# Patient Record
Sex: MALE | Race: WHITE | NOT HISPANIC OR LATINO | Employment: FULL TIME | URBAN - METROPOLITAN AREA
[De-identification: names, ages, dates, MRNs, and addresses within clinical notes are randomized per-mention and may not be internally consistent; named-entity substitution may affect disease eponyms.]

---

## 2017-02-20 ENCOUNTER — COMMUNICATION - HEALTHEAST (OUTPATIENT)
Dept: FAMILY MEDICINE | Facility: CLINIC | Age: 52
End: 2017-02-20

## 2017-02-20 DIAGNOSIS — F17.200 TOBACCO USE DISORDER: ICD-10-CM

## 2018-06-21 ENCOUNTER — OFFICE VISIT - HEALTHEAST (OUTPATIENT)
Dept: FAMILY MEDICINE | Facility: CLINIC | Age: 53
End: 2018-06-21

## 2018-06-21 ENCOUNTER — COMMUNICATION - HEALTHEAST (OUTPATIENT)
Dept: TELEHEALTH | Facility: CLINIC | Age: 53
End: 2018-06-21

## 2018-06-21 DIAGNOSIS — L72.3 SEBACEOUS CYST: ICD-10-CM

## 2018-06-21 DIAGNOSIS — Z12.11 SCREEN FOR COLON CANCER: ICD-10-CM

## 2018-06-21 DIAGNOSIS — Z23 NEED FOR VACCINATION: ICD-10-CM

## 2018-06-21 DIAGNOSIS — E66.9 OBESITY: ICD-10-CM

## 2018-06-21 DIAGNOSIS — F17.200 TOBACCO USE DISORDER: ICD-10-CM

## 2018-06-21 DIAGNOSIS — Z00.00 ANNUAL PHYSICAL EXAM: ICD-10-CM

## 2018-06-21 DIAGNOSIS — E78.5 HYPERLIPIDEMIA, UNSPECIFIED HYPERLIPIDEMIA TYPE: ICD-10-CM

## 2018-06-21 DIAGNOSIS — E55.9 VITAMIN D DEFICIENCY: ICD-10-CM

## 2018-06-21 LAB
ALBUMIN SERPL-MCNC: 4.3 G/DL (ref 3.5–5)
ALP SERPL-CCNC: 96 U/L (ref 45–120)
ALT SERPL W P-5'-P-CCNC: 17 U/L (ref 0–45)
ANION GAP SERPL CALCULATED.3IONS-SCNC: 10 MMOL/L (ref 5–18)
AST SERPL W P-5'-P-CCNC: 16 U/L (ref 0–40)
BILIRUB SERPL-MCNC: 0.6 MG/DL (ref 0–1)
BUN SERPL-MCNC: 18 MG/DL (ref 8–22)
CALCIUM SERPL-MCNC: 10.2 MG/DL (ref 8.5–10.5)
CHLORIDE BLD-SCNC: 104 MMOL/L (ref 98–107)
CHOLEST SERPL-MCNC: 201 MG/DL
CO2 SERPL-SCNC: 28 MMOL/L (ref 22–31)
CREAT SERPL-MCNC: 0.91 MG/DL (ref 0.7–1.3)
FASTING STATUS PATIENT QL REPORTED: YES
GFR SERPL CREATININE-BSD FRML MDRD: >60 ML/MIN/1.73M2
GLUCOSE BLD-MCNC: 88 MG/DL (ref 70–125)
HDLC SERPL-MCNC: 36 MG/DL
HGB BLD-MCNC: 15.4 G/DL (ref 14–18)
LDLC SERPL CALC-MCNC: 122 MG/DL
POTASSIUM BLD-SCNC: 5.2 MMOL/L (ref 3.5–5)
PROT SERPL-MCNC: 7.5 G/DL (ref 6–8)
PSA SERPL-MCNC: 1 NG/ML (ref 0–3.5)
SODIUM SERPL-SCNC: 142 MMOL/L (ref 136–145)
TRIGL SERPL-MCNC: 217 MG/DL

## 2018-06-21 ASSESSMENT — MIFFLIN-ST. JEOR: SCORE: 1944.42

## 2018-06-22 ENCOUNTER — AMBULATORY - HEALTHEAST (OUTPATIENT)
Dept: FAMILY MEDICINE | Facility: CLINIC | Age: 53
End: 2018-06-22

## 2018-06-22 ENCOUNTER — COMMUNICATION - HEALTHEAST (OUTPATIENT)
Dept: FAMILY MEDICINE | Facility: CLINIC | Age: 53
End: 2018-06-22

## 2018-06-22 DIAGNOSIS — E78.1 PURE HYPERGLYCERIDEMIA: ICD-10-CM

## 2018-06-22 DIAGNOSIS — E78.5 HYPERLIPEMIA: ICD-10-CM

## 2018-06-22 LAB — 25(OH)D3 SERPL-MCNC: 33.3 NG/ML (ref 30–80)

## 2018-07-09 ENCOUNTER — RECORDS - HEALTHEAST (OUTPATIENT)
Dept: ADMINISTRATIVE | Facility: OTHER | Age: 53
End: 2018-07-09

## 2018-07-16 ENCOUNTER — RECORDS - HEALTHEAST (OUTPATIENT)
Dept: ADMINISTRATIVE | Facility: OTHER | Age: 53
End: 2018-07-16

## 2018-07-16 ENCOUNTER — RECORDS - HEALTHEAST (OUTPATIENT)
Dept: LAB | Facility: CLINIC | Age: 53
End: 2018-07-16

## 2018-07-16 LAB
APPEARANCE FLD: ABNORMAL
COLOR FLD: YELLOW
CRYSTALS SNV MICRO: ABNORMAL
GLUCOSE FLD-MCNC: 92 MG/DL
LYMPHOCYTES NFR FLD MANUAL: 7 %
MACROPHAGE % - HISTORICAL: 4 %
MONOCYTE % - HISTORICAL: 4 %
NEUTS BAND NFR FLD MANUAL: 82 %
OTHER CELLS FLD MANUAL: 4 %
PROT FLD-MCNC: 4.4 G/DL
RBC FLUID - HISTORICAL: ABNORMAL /UL
WBC # FLD AUTO: ABNORMAL /UL (ref 0–99)

## 2018-07-19 LAB
BACTERIA SPEC CULT: NO GROWTH
BACTERIA SPEC CULT: NORMAL
GRAM STAIN RESULT: NORMAL
GRAM STAIN RESULT: NORMAL

## 2021-01-08 ENCOUNTER — COMMUNICATION - HEALTHEAST (OUTPATIENT)
Dept: TELEHEALTH | Facility: CLINIC | Age: 56
End: 2021-01-08

## 2021-01-08 ENCOUNTER — OFFICE VISIT - HEALTHEAST (OUTPATIENT)
Dept: FAMILY MEDICINE | Facility: CLINIC | Age: 56
End: 2021-01-08

## 2021-01-08 DIAGNOSIS — Z71.89 COUNSELING ON HEALTH CARE DIRECTIVE: ICD-10-CM

## 2021-01-08 DIAGNOSIS — E78.5 HYPERLIPIDEMIA, UNSPECIFIED HYPERLIPIDEMIA TYPE: ICD-10-CM

## 2021-01-08 DIAGNOSIS — Z23 NEED FOR VACCINATION AGAINST STREPTOCOCCUS PNEUMONIAE: ICD-10-CM

## 2021-01-08 DIAGNOSIS — M25.579 PAIN IN JOINT, ANKLE AND FOOT, UNSPECIFIED LATERALITY: ICD-10-CM

## 2021-01-08 DIAGNOSIS — Z00.00 ANNUAL PHYSICAL EXAM: ICD-10-CM

## 2021-01-08 DIAGNOSIS — Z72.0 TOBACCO ABUSE: ICD-10-CM

## 2021-01-08 DIAGNOSIS — Z12.5 SCREENING FOR PROSTATE CANCER: ICD-10-CM

## 2021-01-08 LAB
ALBUMIN SERPL-MCNC: 4.4 G/DL (ref 3.5–5)
ALP SERPL-CCNC: 98 U/L (ref 45–120)
ALT SERPL W P-5'-P-CCNC: 23 U/L (ref 0–45)
ANION GAP SERPL CALCULATED.3IONS-SCNC: 12 MMOL/L (ref 5–18)
AST SERPL W P-5'-P-CCNC: 22 U/L (ref 0–40)
BILIRUB SERPL-MCNC: 0.6 MG/DL (ref 0–1)
BUN SERPL-MCNC: 19 MG/DL (ref 8–22)
CALCIUM SERPL-MCNC: 9.8 MG/DL (ref 8.5–10.5)
CHLORIDE BLD-SCNC: 105 MMOL/L (ref 98–107)
CHOLEST SERPL-MCNC: 284 MG/DL
CO2 SERPL-SCNC: 24 MMOL/L (ref 22–31)
CREAT SERPL-MCNC: 0.88 MG/DL (ref 0.7–1.3)
FASTING STATUS PATIENT QL REPORTED: YES
GFR SERPL CREATININE-BSD FRML MDRD: >60 ML/MIN/1.73M2
GLUCOSE BLD-MCNC: 76 MG/DL (ref 70–125)
HDLC SERPL-MCNC: 51 MG/DL
LDLC SERPL CALC-MCNC: 204 MG/DL
POTASSIUM BLD-SCNC: 4.8 MMOL/L (ref 3.5–5)
PROT SERPL-MCNC: 7.8 G/DL (ref 6–8)
PSA SERPL-MCNC: 2.7 NG/ML (ref 0–3.5)
SODIUM SERPL-SCNC: 141 MMOL/L (ref 136–145)
TRIGL SERPL-MCNC: 145 MG/DL

## 2021-01-08 ASSESSMENT — MIFFLIN-ST. JEOR: SCORE: 1748.81

## 2021-01-14 ENCOUNTER — AMBULATORY - HEALTHEAST (OUTPATIENT)
Dept: FAMILY MEDICINE | Facility: CLINIC | Age: 56
End: 2021-01-14

## 2021-01-14 DIAGNOSIS — L72.3 SEBACEOUS CYST: ICD-10-CM

## 2021-01-22 ENCOUNTER — COMMUNICATION - HEALTHEAST (OUTPATIENT)
Dept: SCHEDULING | Facility: CLINIC | Age: 56
End: 2021-01-22

## 2021-01-28 ENCOUNTER — OFFICE VISIT - HEALTHEAST (OUTPATIENT)
Dept: FAMILY MEDICINE | Facility: CLINIC | Age: 56
End: 2021-01-28

## 2021-01-28 DIAGNOSIS — L72.3 SEBACEOUS CYST: ICD-10-CM

## 2021-01-28 DIAGNOSIS — Z48.02 VISIT FOR SUTURE REMOVAL: ICD-10-CM

## 2021-05-07 ENCOUNTER — OFFICE VISIT - HEALTHEAST (OUTPATIENT)
Dept: FAMILY MEDICINE | Facility: CLINIC | Age: 56
End: 2021-05-07

## 2021-05-07 DIAGNOSIS — Z20.822 EXPOSURE TO 2019 NOVEL CORONAVIRUS: ICD-10-CM

## 2021-05-08 ENCOUNTER — AMBULATORY - HEALTHEAST (OUTPATIENT)
Dept: FAMILY MEDICINE | Facility: CLINIC | Age: 56
End: 2021-05-08

## 2021-05-08 DIAGNOSIS — Z20.822 EXPOSURE TO 2019 NOVEL CORONAVIRUS: ICD-10-CM

## 2021-05-09 LAB
SARS-COV-2 PCR COMMENT: NORMAL
SARS-COV-2 RNA SPEC QL NAA+PROBE: NEGATIVE
SARS-COV-2 VIRUS SPECIMEN SOURCE: NORMAL

## 2021-05-10 ENCOUNTER — COMMUNICATION - HEALTHEAST (OUTPATIENT)
Dept: SCHEDULING | Facility: CLINIC | Age: 56
End: 2021-05-10

## 2021-05-12 ENCOUNTER — COMMUNICATION - HEALTHEAST (OUTPATIENT)
Dept: SCHEDULING | Facility: CLINIC | Age: 56
End: 2021-05-12

## 2021-05-29 ENCOUNTER — HEALTH MAINTENANCE LETTER (OUTPATIENT)
Age: 56
End: 2021-05-29

## 2021-06-01 VITALS — HEIGHT: 72 IN | BODY MASS INDEX: 31.69 KG/M2 | WEIGHT: 234 LBS

## 2021-06-05 VITALS
BODY MASS INDEX: 28.45 KG/M2 | SYSTOLIC BLOOD PRESSURE: 125 MMHG | WEIGHT: 199.7 LBS | HEART RATE: 95 BPM | OXYGEN SATURATION: 99 % | DIASTOLIC BLOOD PRESSURE: 78 MMHG

## 2021-06-05 VITALS
HEART RATE: 83 BPM | HEIGHT: 70 IN | OXYGEN SATURATION: 96 % | DIASTOLIC BLOOD PRESSURE: 84 MMHG | BODY MASS INDEX: 28.2 KG/M2 | WEIGHT: 197 LBS | SYSTOLIC BLOOD PRESSURE: 126 MMHG

## 2021-06-05 VITALS
BODY MASS INDEX: 29.11 KG/M2 | WEIGHT: 204.3 LBS | DIASTOLIC BLOOD PRESSURE: 62 MMHG | OXYGEN SATURATION: 97 % | SYSTOLIC BLOOD PRESSURE: 121 MMHG | HEART RATE: 85 BPM

## 2021-06-14 NOTE — TELEPHONE ENCOUNTER
Patient called back and the area of redness is only about a half a inch from the stitches and has not been expanding.  There is no drainage from the wound and no pain.  I reassured the patient that this sounds like just a local reaction to the stitches and not an infection.  They will continue to change the dressing daily and use either petroleum jelly or bacitracin on the wound.  Will return next week for stitch removal.    Dr. Marquis

## 2021-06-14 NOTE — PROGRESS NOTES
ASSESSMENT/PLAN:       1. Sebaceous cyst 3.5 x 3 cm located mid back, excised with simple closure  Wound care was discussed  Okay to take a shower tomorrow and remove the dressing and have his wife help to put a new dressing on.  Use some petroleum jelly nonadherent pad and then gauze pad with tape    After about 5 days should be okay just to use a big Band-Aid  Suture removal in 2 weeks  When removing sutures will use some benzoin and Steri-Strips to relieve some tension along the suture line      Rufus Marquis MD      PROGRESS NOTE   1/16/2021    SUBJECTIVE:  Oscar Mckay is a 55 y.o. male  who presents for   Chief Complaint   Patient presents with     Procedure     sebaceous cyst removal on back      The patient was referred to me by Jesus Yu CNP to remove a sebaceous cyst on his back that has been persistent and mildly symptomatic.  The patient's medications include atorvastatin 40 mg daily  He has no allergies to medications or other topical products.  He is not on any blood thinners.    Patient Active Problem List   Diagnosis     Nicotine Dependence     Vitamin D Deficiency     Hyperlipidemia     Male Erectile Disorder     Fatigue     Essential Hypertriglyceridemia     Sebaceous cyst       Current Outpatient Medications   Medication Sig Dispense Refill     atorvastatin (LIPITOR) 40 MG tablet Take 1 tablet (40 mg total) by mouth daily. 90 tablet 1     MEN'S MULTI-VITAMIN ORAL Take by mouth.       No current facility-administered medications for this visit.        Social History     Tobacco Use   Smoking Status Current Every Day Smoker     Packs/day: 1.00     Years: 35.00     Pack years: 35.00     Types: Cigarettes   Smokeless Tobacco Never Used           OBJECTIVE:        Recent Results (from the past 240 hour(s))   Comprehensive Metabolic Panel   Result Value Ref Range    Sodium 141 136 - 145 mmol/L    Potassium 4.8 3.5 - 5.0 mmol/L    Chloride 105 98 - 107 mmol/L    CO2 24 22 - 31 mmol/L    Anion  Gap, Calculation 12 5 - 18 mmol/L    Glucose 76 70 - 125 mg/dL    BUN 19 8 - 22 mg/dL    Creatinine 0.88 0.70 - 1.30 mg/dL    GFR MDRD Af Amer >60 >60 mL/min/1.73m2    GFR MDRD Non Af Amer >60 >60 mL/min/1.73m2    Bilirubin, Total 0.6 0.0 - 1.0 mg/dL    Calcium 9.8 8.5 - 10.5 mg/dL    Protein, Total 7.8 6.0 - 8.0 g/dL    Albumin 4.4 3.5 - 5.0 g/dL    Alkaline Phosphatase 98 45 - 120 U/L    AST 22 0 - 40 U/L    ALT 23 0 - 45 U/L   Lipid Cascade FASTING   Result Value Ref Range    Cholesterol 284 (H) <=199 mg/dL    Triglycerides 145 <=149 mg/dL    HDL Cholesterol 51 >=40 mg/dL    LDL Calculated 204 (H) <=129 mg/dL    Patient Fasting > 8hrs? Yes    PSA, Annual Screen (Prostatic-Specific Antigen)   Result Value Ref Range    PSA 2.7 0.0 - 3.5 ng/mL       Vitals:    01/14/21 1254   BP: 125/78   Pulse: 95   SpO2: 99%   Weight: 199 lb 11.2 oz (90.6 kg)     Weight: 199 lb 11.2 oz (90.6 kg)          Physical Exam:  GENERAL APPEARANCE: 55-year-old male, NAD, well hydrated, well nourished  SKIN:  Normal skin turgor, 3.5 x 3 cm subcutaneous mass mid back just to the left of midline, mildly inflamed but not tender and no drainage.  NEURO: no focal findings      PROCEDURE NOTE: Elliptical excision sebaceous cyst mildly inflamed mid back with simple closure, no specimen sent to pathology  I explained to the patient how I would remove the cyst.  I suspected that it was a sebaceous cyst and if so that the specimen would not need to be sent to pathology.  The patient's medications and allergies were reviewed and no contraindications to proceeding.  The patient gave consent to proceed  With the patient in the procedure room lying on his stomach the cyst was identified and the area was cleansed with Hibiclens  1% Xylocaine with epinephrine was used as a local anesthetic requiring 8 ml  There was good anesthesia  An elliptical incision was made with a 15 blade and then with blunt and sharp dissection I was able to remove the entire cyst  wall.  The contents of the cyst looked typical with no signs of infection  The base of the wound was irrigated with saline and closed using 3-0 Ethilon as well as 4-0 Ethilon interrupted sutures requiring 5 stitches.  There was good hemostasis and approximation of the skin edges and a pressure bandage was applied.  Patient tolerated the procedure without any complications.

## 2021-06-14 NOTE — PROGRESS NOTES
ASSESSMENT/PLAN:       1. Visit for suture removal  All of the stitches were removed  No signs of secondary infection  Healing nicely with skin edges well approximated  Steri-Strips applied    2. Sebaceous cyst  Continued ongoing care discussed with the patient and do not anticipate the need for follow-up.    No charge visit as suture removal included in the global charges for the procedure      Rufus Marquis MD      PROGRESS NOTE   1/29/2021    SUBJECTIVE:  Oscar Mckay is a 55 y.o. male  who presents for   Chief Complaint   Patient presents with     Suture / Staple Removal     stitch removal on back      The patient is seen today for stitch removal.  The patient had a sebaceous cyst removed from his back 2 weeks ago.  The patient has been experiencing a small amount of drainage and some itching in the area.  His wife has been keeping it covered and using tape.    Patient Active Problem List   Diagnosis     Nicotine Dependence     Vitamin D Deficiency     Hyperlipidemia     Male Erectile Disorder     Fatigue     Essential Hypertriglyceridemia     Sebaceous cyst       Current Outpatient Medications   Medication Sig Dispense Refill     atorvastatin (LIPITOR) 40 MG tablet Take 1 tablet (40 mg total) by mouth daily. 90 tablet 1     MEN'S MULTI-VITAMIN ORAL Take by mouth.       No current facility-administered medications for this visit.        Social History     Tobacco Use   Smoking Status Current Every Day Smoker     Packs/day: 1.00     Years: 35.00     Pack years: 35.00     Types: Cigarettes   Smokeless Tobacco Never Used           OBJECTIVE:        No results found for this or any previous visit (from the past 240 hour(s)).    Vitals:    01/28/21 1252   BP: 121/62   Pulse: 85   SpO2: 97%   Weight: 204 lb 4.8 oz (92.7 kg)     Weight: 204 lb 4.8 oz (92.7 kg)          Physical Exam:  GENERAL APPEARANCE: 55-year-old male, NAD, well hydrated, well nourished  SKIN:  Normal skin turgor, the wound on his back seems to  be healing nicely.  There are some redness associated with the stitches and also some redness from the tape.  The stitches were removed and the skin edges remained well approximated.  There was a small amount of serosanguineous drainage from the wound.  No induration warmth or tenderness.  The wound was cleansed with some alcohol and Steri-Strips were applied after the use of benzoin.  A large Band-Aid was placed over the wound.  NEURO: no focal findings

## 2021-06-14 NOTE — TELEPHONE ENCOUNTER
Reviewed the triage note and try to call the patient and I believe the other number was his wife and neither answered.  Message left on the patient's cell phone to give me a call back to see if I could help him with his concern about an infection in the wound.  Dr. Marquis

## 2021-06-14 NOTE — PATIENT INSTRUCTIONS - HE
"That shingles vaccine we talked about is called \"Shingrix\". Check with your insurance to see if they cover it. If they do and you're interested in getting it, let me know and we can have you come in for just the shot without having to see me.    Keep an eye on the back. If it stops getting better let me know.    Scheduled you for the cyst removal. No prep is needed.    If you need help quitting smoking, let me know.    Keep a journal of the foot pains and if I doesn't get better I can connect you with a podiatrist.     You got the pneumonia vaccine today.   "

## 2021-06-14 NOTE — TELEPHONE ENCOUNTER
No WALI in chart.  Patient's wife calling to say patient surgery on last Thursday on a cyst and they think it might be infected and is requesting antibiotics.  FNA advised to have patient call in order for triage to occur and patient should be evaluated by a provider for any treatment.     Wife asks if he can be seen in [facility] urgent care or [facility]. FNA advised urgent care should be able to evaluate but not sure if this is something that [facility] will evaluate and treat but patient can go there and see.      Clifford Alvarado RN/[facility]      Additional Information    Negative: Requesting regular office appointment    Negative: [1] Caller requesting NON-URGENT health information AND [2] PCP's office is the best resource    [1] Caller is not with the adult (patient) AND [2] probable NON-URGENT symptoms    Negative: Health Information question, no triage required and triager able to answer question    Negative: General information question, no triage required and triager able to answer question    Negative: Question about upcoming scheduled test, no triage required and triager able to answer question    Protocols used: INFORMATION ONLY CALL-A-

## 2021-06-17 NOTE — PROGRESS NOTES
"Oscar Mckay is a 55 y.o. male who is being evaluated via a billable telephone visit.      What phone number would you like to be contacted at? [phone]  How would you like to obtain your AVS? AVS Preference: Mail a copy.    Assessment & Plan   Problem List Items Addressed This Visit     None      Visit Diagnoses     Exposure to 2019 novel coronavirus    -  Primary    High risk contact. Will test. If negative and remains symptom free, may return to work.     Relevant Orders    Asymptomatic COVID-19 Virus (CORONAVIRUS) PCR             Tobacco Cessation:   reports that he has been smoking cigarettes. He has a 35.00 pack-year smoking history. He has never used smokeless tobacco.      BMI:   Estimated body mass index is 29.11 kg/m  as calculated from the following:    Height as of 1/8/21: 5' 10.25\" (1.784 m).    Weight as of 1/28/21: 204 lb 4.8 oz (92.7 kg).       Return in about 8 months (around 1/20/2022) for Annual physical.    [facility]   Oscar Mckay is 55 y.o. and presents today for the following health issues   COVID exposure. Close friend he spent time with [place] tested positive for COVID on 5/5. Patient has no symptoms. Denies fever, cough, myalgias, headache, loss of smell or taste or changes to urinary or bowel habits. Work requires negative test before he can return to work.       Review of Systems   All other systems reviewed and are negative.          Objective       Vitals:  No vitals were obtained today due to virtual visit.    Physical Exam  No physical exam. Encounter handled by phone.            Phone call duration: 8 minutes  "

## 2021-06-18 NOTE — PROGRESS NOTES
".  Assessment/Plan:     Oscar Mckay is a 52 y.o. male  who presents for   Chief Complaint   Patient presents with     Annual Exam     fasting     1. Annual physical exam  PSA, Annual Screen (Prostatic-Specific Antigen)   2. Screen for colon cancer  Ambulatory referral for Colonoscopy    Hemoglobin   3. Hyperlipidemia, unspecified hyperlipidemia type  Comprehensive Metabolic Panel    Lipid Cascade FASTING   4. Vitamin D deficiency  Vitamin D, Total (25-Hydroxy)   5. Sebaceous cyst     6. Obesity     7. Nicotine Dependence     8. Need for vaccination  Tdap vaccine,  8yo or older,  IM     The 10-year ASCVD risk score ([place] PERRY Jr, et al., 2013) is: 12.2%    Values used to calculate the score:      Age: 52 years      Sex: Male      Is Non- : No      Diabetic: No      Tobacco smoker: Yes      Systolic Blood Pressure: 122 mmHg      Is BP treated: No      HDL Cholesterol: 33 mg/dL      Total Cholesterol: 219 mg/dL      1. Annual physical exam    - PSA, Annual Screen (Prostatic-Specific Antigen)    2. Screen for colon cancer    - Ambulatory referral for Colonoscopy  - Hemoglobin    3. Hyperlipidemia, unspecified hyperlipidemia type    - Comprehensive Metabolic Panel  - Lipid Cascade FASTING    4. Vitamin D deficiency    - Vitamin D, Total (25-Hydroxy)    5. Sebaceous cyst    -incision and drainage performed    6. Obesity    -\"working on it\"    7. Nicotine Dependence    -declined smoking cessation program today    8. Need for vaccination    - Tdap vaccine,  8yo or older,  IM    I have counseled the patient for tobacco cessation and the follow up will occur  at the next visit.  Routine health maintenance discussion:  No smoking, limited alcohol (7 or less servings per week), 5 fruits/veg servings per day, 200 minutes of exercise per week.  Daily calcium/vitamin D guidelines, bone health,colon cancer screening beginning at age 50.  Accident avoidance, sun screen.  Monthly testicular exam discussed. "   Will follow up with patient regarding laboratory results obtained today.     Return in 3 months for hyperlipidemia lab work to evaluate effectiveness of statin Rx    Subjective:     Oscar Mckay is a 52 y.o. male male who presents for an annual exam. The patient reports that there is not domestic violence in his life.  Medical, family and surgical history reviewed.  Patient is  has 1 child age 17.  He works full-time as a mailman.  He does drink 12 alcoholic beverages per week, smokes 1 pack of cigarettes per day and denies illicit drug use.  He has tried Chantix medication for smoking cessation ×4, he always finds he goes back to smoking.  Diet consists of high protein and low carbohydrate, he is down 9 pounds from his last physical performed in 2016.  Patient was previously taking a statin for his elevated cholesterol levels. He did run out of the medication because he did not follow up with his PCP. Last dose of statin medication was roughly 6 months ago.     He would like me to take a look at a cyst on the left upper back today that is been present there for the last several months.  Cyst measures 3 x 3 cm in size, mild erythema noted. Site cleaned with alcohol, lidocaine injected into the cyst in 5 separate positions. Cyst incised using # 15 blade scalpel, unable to extract any cystic content, appears to be made up of tissue, small amount of bleeding noted. Site was cleaned and covered with bacitracin ointment and covered with large band-aid. NO antibiotic therapy initiated today.     He has been experiencing right great toe pain for 2 weeks, he recently did run into a door and smashed his toe.  He states the pain is intermittent and describes it as an aching, burning sensation.  Aggravating factors: Walking long distances.  Relieving factors: He has been wearing a larger size shoe.  He has not noticed any redness, swelling or drainage coming from the toe, no history of gout.  He is rating the right  great toe pain a 4 out of 10.      He is overdue for tetanus vaccination, Tdap administered today.  Orders placed for Cologard testing. Vital signs stable, all concerns addressed.     Healthy Habits:   Regular Exercise: No, discussed  Sunscreen Use: Yes  Healthy Diet: Yes  Dental Visits Regularly: Yes, yearly  Seat Belt: Yes  Sexually active: Yes, wife Teresa, declined STD testing  Monthly Self Testicular Exams:  Yes  Hemoccults: No  Flex Sig: No  Colonoscopy: No, agrees to complete COLOGARD  Lipid Profile: Yes  Glucose Screen: Yes  Prevention of Osteoporosis: Yes  Last Dexa: No  Guns at Home:  No      Immunization History   Administered Date(s) Administered     DT (pediatric) 02/27/2006     Hep A, historic 11/28/2007, 11/17/2009     Influenza, seasonal,quad inj 36+ mos 10/13/2016     Td,adult,historic,unspecified 02/27/2006     Tdap 11/28/2007, 06/21/2018     Vision Screening: Deferred   Hearing: Deferred      Current Outpatient Prescriptions   Medication Sig Dispense Refill     ibuprofen (ADVIL,MOTRIN) 800 MG tablet Take 800 mg by mouth.       No current facility-administered medications for this visit.      Past Medical History:   Diagnosis Date     Other and unspecified hyperlipidemia      Tobacco use disorder      Unspecified vitamin D deficiency      Past Surgical History:   Procedure Laterality Date     WISDOM TOOTH EXTRACTION       Review of patient's allergies indicates no known allergies.  Family History   Problem Relation Age of Onset     Diabetes Maternal Grandfather      No Medical Problems Mother      No Medical Problems Sister      No Medical Problems Brother      No Medical Problems Brother      No Medical Problems Sister      Social History     Social History     Marital status:      Spouse name: Teresa     Number of children: 1     Years of education: 12     Occupational History     Mail man      Social History Main Topics     Smoking status: Current Every Day Smoker     Smokeless tobacco:  Never Used     Alcohol use Yes      Comment: 14     Drug use: No     Sexual activity: Yes     Partners: Female     Other Topics Concern     Not on file     Social History Narrative       Review of Systems  General:  Denies fever, chills, HA, fatigue, myalgias, weight change    Eyes: Denies vision changes   Ears/Nose/Throat: Denies nasal congestion, rhinorrhea, ear pain or discharge, sore throat, swollen glands  Cardiovascular: CP, palpitations  Respiratory:  SOB, cough  Gastrointestinal:  Denies changes in bowel habits, melena, rectal bleeding,  Genitourinary: Denies changes in urine habits/frequency/dysuria, hematuria   Musculoskeletal:  Denies swelling or erythema, edema  Skin: Denies rashes   Neurologic: Denies weakness, paresthesia  Psychiatric: Denies mood changes   Endocrine: Denies polyuria, polydipsia, polyphagia  Heme/Lymphatic: Denies problem with bleeding   Allergic/Immunologic: Denies problem    Positive: right great toe pain, cyst on back  Objective:     Vitals:    06/21/18 1316   BP: 122/80   Pulse: 65   Resp: 16   Temp: 98.4  F (36.9  C)   SpO2: 98%   Weight: (!) 234 lb (106.1 kg)   Height: 6' (1.829 m)   PainSc:   4   PainLoc: Toe       Physical  General Appearance: Alert, cooperative, no distress, appears stated age  Head: Normocephalic, without obvious abnormality, atraumatic  Eyes: PERRL, conjunctiva/corneas clear, EOM's intact  Ears: Normal TM's and external ear canals, both ears  Nose: Nares normal, septum midline,mucosa normal, no drainage  Throat: Lips, mucosa, and tongue normal; teeth and gums normal  Neck: Supple, symmetrical, trachea midline, no adenopathy;  thyroid: not enlarged, symmetric, no tenderness/mass/nodules; no carotid bruit or JVD  Back: Symmetric, no curvature, ROM normal, no CVA tenderness  Lungs: Clear to auscultation bilaterally, respirations unlabored  Heart: Regular rate and rhythm, S1 and S2 normal, no murmur, rub, or gallop,  Abdomen: Soft, non-tender, bowel sounds  active all four quadrants,  no masses, no organomegaly  Genitourinary: Penis normal. Right testis is descended. Left testis is descended. Prostate is firm and smooth  Musculoskeletal: Normal range of motion. No joint swelling or deformity. Ingrown toe nail noted on right great toe  Extremities: Extremities normal, atraumatic, no cyanosis or edema  Skin: Skin color, texture, turgor normal, no rashes or lesions. 3 x 3 cm sebaceous cyst on left upper back, mild erythema  Lymph nodes: Cervical, supraclavicular, and axillary nodes normal  Neurologic: He is alert. He has normal reflexes.   Psychiatric: He has a normal mood and affect.     No results found for this or any previous visit (from the past 240 hour(s)).

## 2021-06-30 NOTE — PROGRESS NOTES
Progress Notes by Jesus Yu CNP at 1/8/2021  9:40 AM     Author: Jesus Yu CNP Service: -- Author Type: Nurse Practitioner    Filed: 1/10/2021  3:09 PM Encounter Date: 1/8/2021 Status: Addendum    : Jesus Yu CNP (Nurse Practitioner)    Related Notes: Original Note by Jesus Yu CNP (Nurse Practitioner) filed at 1/10/2021  1:49 PM       psaAssessment:      Healthy male exam.      Plan:      1. Annual physical exam     2. Pain in joint, ankle and foot, unspecified laterality     3. Tobacco abuse     4. Hyperlipidemia, unspecified hyperlipidemia type  Comprehensive Metabolic Panel    Lipid Cascade FASTING   5. Need for vaccination against Streptococcus pneumoniae  Pneumococcal polysaccharide vaccine 23-valent 3 yo or older, subq/IM   6. Screening for prostate cancer  PSA, Annual Screen (Prostatic-Specific Antigen)   7. Counseling on health care directive       Unclear cause of ankle pain.  Seems to come and go.  Recommended he keep a diary of when this occurs to see if we can find some sort of rhyme or reason.  Update Pneumovax given smoking history.  Screening labs ordered.  Encouraged tobacco cessation.  Lower back is slowly improving.  Likely musculoskeletal strain due to his physical job.  He can let me know if this fails to improve as anticipated and we can try some PT.  In the meantime, over-the-counter pain management.  We will see if Dr. Marquis is able to remove the cyst.    Subjective:      Oscar Mckay is a 55 y.o. male who presents for an annual exam. The patient reports that there is not domestic violence in his life.     Overall patient is doing okay.  Continues to hold off on his weight loss that he achieved through the ketogenic diet.  Currently works as a  and he notices some musculoskeletal discomforts associated with this.  He is starting to have some back pain issues along the lumbar spine without radicular symptoms.  Denies saddle anesthesia or  bowel/bladder incontinence.  He also notices intermittent sharp stabbing pains and one of his ankles but he cannot remember which one.  Thinks may be the left.  Atraumatic.  No gross anatomical deformity.  Unfortunately continues to smoke.  Has tried Chantix and cold turkey each without long-term success.    Healthy Habits:   Regular Exercise: Yes  Sunscreen Use: No  Healthy Diet: Yes  Dental Visits Regularly: Yes  Seat Belt: Yes  Sexually active: Yes  Monthly Self Testicular Exams:  No  Hemoccults: Yes and Negative cologuard on 7/9/18.   Flex Sig: No  Colonoscopy: No  Lipid Profile: Yes  Glucose Screen: Yes  Prevention of Osteoporosis: Yes and Takes a multivitamin.   Last Dexa: No  Guns at Home:  No      Immunization History   Administered Date(s) Administered   ? DT (pediatric) 02/27/2006   ? Hep A, historic 11/28/2007, 11/17/2009   ? Influenza,seasonal,quad inj =/> 6months 10/13/2016   ? Td,adult,historic,unspecified 02/27/2006   ? Tdap 11/28/2007, 06/21/2018     Immunization status: up to date and documented.    No exam data present     Current Outpatient Medications   Medication Sig Dispense Refill   ? MEN'S MULTI-VITAMIN ORAL Take by mouth.     ? atorvastatin (LIPITOR) 40 MG tablet Take 1 tablet (40 mg total) by mouth daily. 90 tablet 1     No current facility-administered medications for this visit.      Past Medical History:   Diagnosis Date   ? Other and unspecified hyperlipidemia    ? Tobacco use disorder    ? Unspecified vitamin D deficiency      Past Surgical History:   Procedure Laterality Date   ? WISDOM TOOTH EXTRACTION       Patient has no known allergies.  Family History   Problem Relation Age of Onset   ? Diabetes Maternal Grandfather    ? Arthritis Mother    ? No Medical Problems Half Sister    ? No Medical Problems Half Sister    ? No Medical Problems Half Brother    ? No Medical Problems Half Brother      Social History     Socioeconomic History   ? Marital status:      Spouse name: Teresa  "  ? Number of children: 1   ? Years of education: 12   ? Highest education level: Not on file   Occupational History   ? 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 Financial resource strain: Not on file   ? Food insecurity     Worry: Not on file     Inability: Not on file   ? Transportation needs     Medical: Not on file     Non-medical: Not on file   Tobacco Use   ? Smoking status: Current Every Day Smoker     Packs/day: 1.00     Years: 35.00     Pack years: 35.00     Types: Cigarettes   ? Smokeless tobacco: Never Used   Substance and Sexual Activity   ? Alcohol use: Yes     Alcohol/week: 12.0 standard drinks     Types: 12 Standard drinks or equivalent per week   ? Drug use: No   ? Sexual activity: Yes     Partners: Female   Lifestyle   ? Physical activity     Days per week: Not on file     Minutes per session: Not on file   ? Stress: Not on file   Relationships   ? Social connections     Talks on phone: Not on file     Gets together: Not on file     Attends Yazidism service: Not on file     Active member of club or organization: Not on file     Attends meetings of clubs or organizations: Not on file     Relationship status: Not on file   ? Intimate partner violence     Fear of current or ex partner: Not on file     Emotionally abused: Not on file     Physically abused: Not on file     Forced sexual activity: Not on file   Other Topics Concern   ? Not on file   Social History Narrative   ? Not on file       Review of Systems  Review of Systems      Review of Systems - Negative except ankle pain and low back pain      Objective:     Vitals:    01/08/21 0933 01/08/21 0941   BP: 148/90 140/86   Pulse: 83    SpO2: 96%    Weight: 197 lb (89.4 kg)    Height: 5' 10.25\" (1.784 m)      Body mass index is 28.07 kg/m .    Physical  Physical Exam      General appearance - alert, well appearing, and in no distress and oriented to person, place, and time  Mental status - alert, oriented to person, place, and time  Eyes - pupils " equal and reactive, extraocular eye movements intact  Ears - bilateral TM's and external ear canals normal  Nose - normal and patent, no erythema, discharge or polyps  Mouth - mucous membranes moist, pharynx normal without lesions  Neck - supple, no significant adenopathy, carotids upstroke normal bilaterally, no bruits  Lymphatics - no palpable lymphadenopathy, no hepatosplenomegaly  Chest - clear to auscultation, no wheezes, rales or rhonchi, symmetric air entry  Heart - normal rate, regular rhythm, normal S1, S2, no murmurs, rubs, clicks or gallops  Abdomen - soft, nontender, nondistended, no masses or organomegaly  Back exam -no significant tenderness with palpation along the thoracic/lumbar spine or supraspinous muscles.  Neurological - alert, oriented, normal speech, no focal findings or movement disorder noted, neck supple without rigidity, cranial nerves II through XII intact, DTR's normal and symmetric, motor and sensory grossly normal bilaterally, normal muscle tone, no tremors, strength 5/5  Musculoskeletal - no joint tenderness, deformity or swelling  Extremities - peripheral pulses normal, no pedal edema, no clubbing or cyanosis  Skin -firm palpable lump.  Mobile.            Jesus Yu, CNP

## 2021-07-03 NOTE — ADDENDUM NOTE
Addendum Note by Uri Yu CNP at 1/8/2021  9:40 AM     Author: Uri Yu CNP Service: -- Author Type: Nurse Practitioner    Filed: 1/10/2021  3:11 PM Encounter Date: 1/8/2021 Status: Signed    : Uri Yu CNP (Nurse Practitioner)    Addended by: URI YU on: 1/10/2021 03:11 PM        Modules accepted: Orders

## 2021-07-26 DIAGNOSIS — E78.5 HYPERLIPIDEMIA, UNSPECIFIED HYPERLIPIDEMIA TYPE: Primary | ICD-10-CM

## 2021-07-29 RX ORDER — ATORVASTATIN CALCIUM 40 MG/1
TABLET, FILM COATED ORAL
Qty: 90 TABLET | Refills: 1 | Status: SHIPPED | OUTPATIENT
Start: 2021-07-29 | End: 2022-01-27

## 2021-07-29 NOTE — TELEPHONE ENCOUNTER
"Last Written Prescription Date:  1/10/21  Last Fill Quantity: 90,  # refills: 1   Last office visit provider:  5/7/21     Requested Prescriptions   Pending Prescriptions Disp Refills     atorvastatin (LIPITOR) 40 MG tablet [Pharmacy Med Name: ATORVASTATIN 40MG TABLETS] 90 tablet      Sig: TAKE 1 TABLET(40 MG) BY MOUTH DAILY       Statins Protocol Failed - 7/26/2021  3:27 AM        Failed - LDL on file in past 12 months     Recent Labs   Lab Test 01/08/21  1039   *             Passed - No abnormal creatine kinase in past 12 months     No lab results found.             Passed - Recent (12 mo) or future (30 days) visit within the authorizing provider's specialty     Patient has had an office visit with the authorizing provider or a provider within the authorizing providers department within the previous 12 mos or has a future within next 30 days. See \"Patient Info\" tab in inbasket, or \"Choose Columns\" in Meds & Orders section of the refill encounter.              Passed - Medication is active on med list        Passed - Patient is age 18 or older             Arthur Rodriguez RN 07/29/21 8:35 AM  "

## 2021-09-18 ENCOUNTER — HEALTH MAINTENANCE LETTER (OUTPATIENT)
Age: 56
End: 2021-09-18

## 2022-02-14 ENCOUNTER — LAB (OUTPATIENT)
Dept: FAMILY MEDICINE | Facility: CLINIC | Age: 57
End: 2022-02-14
Payer: COMMERCIAL

## 2022-02-14 DIAGNOSIS — Z20.822 ENCOUNTER FOR LABORATORY TESTING FOR COVID-19 VIRUS: ICD-10-CM

## 2022-02-14 PROCEDURE — U0003 INFECTIOUS AGENT DETECTION BY NUCLEIC ACID (DNA OR RNA); SEVERE ACUTE RESPIRATORY SYNDROME CORONAVIRUS 2 (SARS-COV-2) (CORONAVIRUS DISEASE [COVID-19]), AMPLIFIED PROBE TECHNIQUE, MAKING USE OF HIGH THROUGHPUT TECHNOLOGIES AS DESCRIBED BY CMS-2020-01-R: HCPCS

## 2022-02-14 PROCEDURE — U0005 INFEC AGEN DETEC AMPLI PROBE: HCPCS

## 2022-02-15 ENCOUNTER — TELEPHONE (OUTPATIENT)
Dept: NURSING | Facility: CLINIC | Age: 57
End: 2022-02-15
Payer: COMMERCIAL

## 2022-02-15 LAB — SARS-COV-2 RNA RESP QL NAA+PROBE: POSITIVE

## 2022-02-15 NOTE — TELEPHONE ENCOUNTER
"Coronavirus (COVID-19) Notification    Caller Name (Patient, parent, daughter/son, grandparent, etc)  Oscar     Reason for call  Notify of Positive Coronavirus (COVID-19) lab results, assess symptoms,  review [facility] recommendations    Lab Result    Lab test:  2019-nCoV rRt-PCR or SARS-CoV-2 PCR    Oropharyngeal AND/OR nasopharyngeal swabs is POSITIVE for 2019-nCoV RNA/SARS-COV-2 PCR (COVID-19 virus)    RN Recommendations/Instructions per [facility] Coronavirus COVID-19 recommendations    Brief introduction script  Introduce self then review script:  \"I am calling on behalf of [employer].  We were notified that your Coronavirus test (COVID-19) for was POSITIVE for the virus.  I have some information to relay to you but first I wanted to mention that the [place] Dept of Health will be contacting you shortly [it's possible MD already called Patient] to talk to you more about how you are feeling and other people you have had contact with who might now also have the virus.  Also,  [employer] is Partnering with the [facility] for Covid-19 research, you may be contacted directly by research staff.\"      Assessment (Inquire about Patient's current symptoms)   Assessment   Current Symptoms at time of phone call: (if no symptoms, document No symptoms] No   Date of symptom(s) onset (if applicable) 2/11/22     If at time of call, Patients symptoms hare worsened, the Patient should contact 911 or have someone drive them to Emergency Dept promptly:      If Patient calling 911, inform 911 personal that you have tested positive for the Coronavirus (COVID-19).  Place mask on and await 911 to arrive.    If Emergency Dept, If possible, please have another adult drive you to the Emergency Dept but you need to wear mask when in contact with other people.          Treatment Options:   Patient classified as COVID treatment eligible by Epic high risk algorithm: No  You may be eligible to receive a new " treatment with a monoclonal antibody for preventing hospitalization in patients at high risk for complications from COVID-19.  This medication is still experimental and available on a limited basis; it is given through an IV and must be given at an infusion center.  Please note that not all people who are eligible will receive the medication since it is in limited supply.  Are you interested in being considered for this medication?  No.  Reason patient declined:  Other: NA    Review information with Patient    Your result was positive. This means you have COVID-19 (coronavirus).  We have sent you a letter that reviews the information that I'll be reviewing with you now.    How can I protect others?    If you have symptoms: stay home and away from others (self-isolate) until:    You've had no fever--and no medicine that reduces fever--for 1 full day (24 hours). And       Your other symptoms have gotten better. For example, your cough or breathing has improved. And     At least 10 days have passed since your symptoms started. (If you've been told by a doctor that you have a weak immune system, wait 20 days.)     If you don't have symptoms: Stay home and away from others (self-isolate) until at least 10 days have passed since your first positive COVID-19 test. (Date test collected)    During this time:    Stay in your own room, including for meals. Use your own bathroom if you can.    Stay away from others in your home. No hugging, kissing or shaking hands. No visitors.     Don't go to work, school or anywhere else.     Clean  high touch  surfaces often (doorknobs, counters, handles, etc.). Use a household cleaning spray or wipes. You'll find a full list on the EPA website at www.epa.gov/pesticide-registration/list-n-disinfectants-use-against-sars-cov-2.     Cover your mouth and nose with a mask, tissue or other face covering to avoid spreading germs.    Wash your hands and face often with soap and water.    Make a  list of people you have been in close contact with recently, even if either of you wore a face covering.   - Start your list from 2 days before you became ill or had a positive test.  - Include anyone that was within 6 feet of you for a cumulative total of 15 minutes or more in 24 hours. (Example: if you sat next to Basil for 5 minutes in the morning and 10 minutes in the afternoon, then you were in close contact for 15 minutes total that day. Basil would be added to your list.)    A public health worker will call or text you. It is important that you answer. They will ask you questions about possible exposures to COVID-19, such as people you have been in direct contact with and places you have visited.    Tell the people on your list that you have COVID-19; they should stay away from others for 14 days starting from the last time they were in contact with you (unless you are told something different from a public health worker).     Caregivers in these groups are at risk for severe illness due to COVID-19:  o People 65 years and older  o People who live in a nursing home or long-term care facility  o People with chronic disease (lung, heart, cancer, diabetes, kidney, liver, immunologic)  o People who have a weakened immune system, including those who:  - Are in cancer treatment  - Take medicine that weakens the immune system, such as corticosteroids  - Had a bone marrow or organ transplant  - Have an immune deficiency  - Have poorly controlled HIV or AIDS  - Are obese (body mass index of 40 or higher)  - Smoke regularly    Caregivers should wear gloves while washing dishes, handling laundry and cleaning bedrooms and bathrooms.    Wash and dry laundry with special caution. Don't shake dirty laundry, and use the warmest water setting you can.    If you have a weakened immune system, ask your doctor about other actions you should take.    For more tips, go to  www.cdc.gov/coronavirus/2019-ncov/downloads/10Things.pdf.    You should not go back to work until you meet the guidelines above for ending your home isolation. You don't need to be retested for COVID-19 before going back to work--studies show that you won't spread the virus if it's been at least 10 days since your symptoms started (or 20 days, if you have a weak immune system).    Employers: This document serves as formal notice of your employee's medical guidelines for going back to work. They must meet the above guidelines before going back to work in person.    How can I take care of myself?    1. Get lots of rest. Drink extra fluids (unless a doctor has told you not to).    2. Take Tylenol (acetaminophen) for fever or pain. If you have liver or kidney problems, ask your family doctor if it's okay to take Tylenol.     Take either:     650 mg (two 325 mg pills) every 4 to 6 hours, or     1,000 mg (two 500 mg pills) every 8 hours as needed.     Note: Don't take more than 3,000 mg in one day. Acetaminophen is found in many medicines (both prescribed and over-the-counter medicines). Read all labels to be sure you don't take too much.    For children, check the Tylenol bottle for the right dose (based on their age or weight).    3. If you have other health problems (like cancer, heart failure, an organ transplant or severe kidney disease): Call your specialty clinic if you don't feel better in the next 2 days.    4. Know when to call 911: Emergency warning signs include:    Trouble breathing or shortness of breath    Pain or pressure in the chest that doesn't go away    Feeling confused like you haven't felt before, or not being able to wake up    Bluish-colored lips or face    5. Sign up for [employer]. We know it's scary to hear that you have COVID-19. We want to track your symptoms to make sure you're okay over the next 2 weeks. Please look for an email from [employer]--this is a free, online program that we'll  use to keep in touch. To sign up, follow the link in the email. Learn more at [email or web address].    Where can I get more information?    [facility]: [email or web address]    Coronavirus Basics: www.health.[facility].[place]/diseases/coronavirus/basics.html    What to Do If You're Sick: www.cdc.gov/coronavirus/2019-ncov/about/steps-when-sick.html    Ending Home Isolation: www.cdc.gov/coronavirus/2019-ncov/hcp/disposition-in-home-patients.html     Caring for Someone with COVID-19: www.cdc.gov/coronavirus/2019-ncov/if-you-are-sick/care-for-someone.html     [facility] clinical trials (COVID-19 research studies): clinicalaffairs.[facility]-clinical-trials     A Positive COVID-19 letter will be sent via [employer] or the mail. (Exception, no letters sent to Presurgerical/Preprocedure Patients)    Paola Schofield

## 2022-03-05 ENCOUNTER — HEALTH MAINTENANCE LETTER (OUTPATIENT)
Age: 57
End: 2022-03-05

## 2022-11-20 ENCOUNTER — HEALTH MAINTENANCE LETTER (OUTPATIENT)
Age: 57
End: 2022-11-20

## 2023-04-05 PROBLEM — F52.8 PSYCHOSEXUAL DYSFUNCTION WITH INHIBITED SEXUAL EXCITEMENT: Status: ACTIVE | Noted: 2023-04-05

## 2023-04-05 PROBLEM — E55.9 VITAMIN D DEFICIENCY: Status: ACTIVE | Noted: 2023-04-05

## 2023-04-07 ENCOUNTER — OFFICE VISIT (OUTPATIENT)
Dept: FAMILY MEDICINE | Facility: CLINIC | Age: 58
End: 2023-04-07
Payer: COMMERCIAL

## 2023-04-07 VITALS
WEIGHT: 203.8 LBS | BODY MASS INDEX: 28.53 KG/M2 | TEMPERATURE: 97.8 F | OXYGEN SATURATION: 95 % | DIASTOLIC BLOOD PRESSURE: 78 MMHG | HEART RATE: 72 BPM | HEIGHT: 71 IN | RESPIRATION RATE: 16 BRPM | SYSTOLIC BLOOD PRESSURE: 140 MMHG

## 2023-04-07 DIAGNOSIS — Z00.00 ANNUAL PHYSICAL EXAM: Primary | ICD-10-CM

## 2023-04-07 DIAGNOSIS — E55.9 VITAMIN D DEFICIENCY: ICD-10-CM

## 2023-04-07 DIAGNOSIS — Z13.1 SCREENING FOR DIABETES MELLITUS: ICD-10-CM

## 2023-04-07 DIAGNOSIS — Z72.0 TOBACCO ABUSE: ICD-10-CM

## 2023-04-07 DIAGNOSIS — R53.83 OTHER FATIGUE: ICD-10-CM

## 2023-04-07 DIAGNOSIS — Z12.5 SCREENING FOR PROSTATE CANCER: ICD-10-CM

## 2023-04-07 DIAGNOSIS — Z87.891 PERSONAL HISTORY OF TOBACCO USE: ICD-10-CM

## 2023-04-07 DIAGNOSIS — Z12.11 SCREEN FOR COLON CANCER: ICD-10-CM

## 2023-04-07 DIAGNOSIS — E78.5 HYPERLIPIDEMIA, UNSPECIFIED HYPERLIPIDEMIA TYPE: ICD-10-CM

## 2023-04-07 DIAGNOSIS — E78.1 HYPERTRIGLYCERIDEMIA: ICD-10-CM

## 2023-04-07 LAB
ALBUMIN SERPL BCG-MCNC: 4.7 G/DL (ref 3.5–5.2)
ALP SERPL-CCNC: 81 U/L (ref 40–129)
ALT SERPL W P-5'-P-CCNC: 21 U/L (ref 10–50)
ANION GAP SERPL CALCULATED.3IONS-SCNC: 12 MMOL/L (ref 7–15)
AST SERPL W P-5'-P-CCNC: 23 U/L (ref 10–50)
BILIRUB SERPL-MCNC: 0.4 MG/DL
BUN SERPL-MCNC: 14.6 MG/DL (ref 6–20)
CALCIUM SERPL-MCNC: 9.9 MG/DL (ref 8.6–10)
CHLORIDE SERPL-SCNC: 105 MMOL/L (ref 98–107)
CHOLEST SERPL-MCNC: 248 MG/DL
CREAT SERPL-MCNC: 0.94 MG/DL (ref 0.67–1.17)
DEPRECATED HCO3 PLAS-SCNC: 26 MMOL/L (ref 22–29)
ERYTHROCYTE [DISTWIDTH] IN BLOOD BY AUTOMATED COUNT: 12.8 % (ref 10–15)
GFR SERPL CREATININE-BSD FRML MDRD: >90 ML/MIN/1.73M2
GLUCOSE SERPL-MCNC: 91 MG/DL (ref 70–99)
HBA1C MFR BLD: 5.8 % (ref 0–5.6)
HCT VFR BLD AUTO: 47.6 % (ref 40–53)
HDLC SERPL-MCNC: 49 MG/DL
HGB BLD-MCNC: 15.8 G/DL (ref 13.3–17.7)
LDLC SERPL CALC-MCNC: 178 MG/DL
MCH RBC QN AUTO: 32.3 PG (ref 26.5–33)
MCHC RBC AUTO-ENTMCNC: 33.2 G/DL (ref 31.5–36.5)
MCV RBC AUTO: 97 FL (ref 78–100)
NONHDLC SERPL-MCNC: 199 MG/DL
PLATELET # BLD AUTO: 273 10E3/UL (ref 150–450)
POTASSIUM SERPL-SCNC: 4.3 MMOL/L (ref 3.4–5.3)
PROT SERPL-MCNC: 8 G/DL (ref 6.4–8.3)
PSA SERPL DL<=0.01 NG/ML-MCNC: 3.43 NG/ML (ref 0–3.5)
RBC # BLD AUTO: 4.89 10E6/UL (ref 4.4–5.9)
SODIUM SERPL-SCNC: 143 MMOL/L (ref 136–145)
TRIGL SERPL-MCNC: 103 MG/DL
TSH SERPL DL<=0.005 MIU/L-ACNC: 1.76 UIU/ML (ref 0.3–4.2)
WBC # BLD AUTO: 7.6 10E3/UL (ref 4–11)

## 2023-04-07 PROCEDURE — 80061 LIPID PANEL: CPT | Performed by: PHYSICIAN ASSISTANT

## 2023-04-07 PROCEDURE — 99214 OFFICE O/P EST MOD 30 MIN: CPT | Mod: 25 | Performed by: PHYSICIAN ASSISTANT

## 2023-04-07 PROCEDURE — 36415 COLL VENOUS BLD VENIPUNCTURE: CPT | Performed by: PHYSICIAN ASSISTANT

## 2023-04-07 PROCEDURE — 84403 ASSAY OF TOTAL TESTOSTERONE: CPT | Performed by: PHYSICIAN ASSISTANT

## 2023-04-07 PROCEDURE — 90677 PCV20 VACCINE IM: CPT | Performed by: PHYSICIAN ASSISTANT

## 2023-04-07 PROCEDURE — G0103 PSA SCREENING: HCPCS | Performed by: PHYSICIAN ASSISTANT

## 2023-04-07 PROCEDURE — 84443 ASSAY THYROID STIM HORMONE: CPT | Performed by: PHYSICIAN ASSISTANT

## 2023-04-07 PROCEDURE — 83036 HEMOGLOBIN GLYCOSYLATED A1C: CPT | Performed by: PHYSICIAN ASSISTANT

## 2023-04-07 PROCEDURE — 82306 VITAMIN D 25 HYDROXY: CPT | Performed by: PHYSICIAN ASSISTANT

## 2023-04-07 PROCEDURE — 84270 ASSAY OF SEX HORMONE GLOBUL: CPT | Performed by: PHYSICIAN ASSISTANT

## 2023-04-07 PROCEDURE — 90750 HZV VACC RECOMBINANT IM: CPT | Performed by: PHYSICIAN ASSISTANT

## 2023-04-07 PROCEDURE — 80053 COMPREHEN METABOLIC PANEL: CPT | Performed by: PHYSICIAN ASSISTANT

## 2023-04-07 PROCEDURE — 90471 IMMUNIZATION ADMIN: CPT | Performed by: PHYSICIAN ASSISTANT

## 2023-04-07 PROCEDURE — 90472 IMMUNIZATION ADMIN EACH ADD: CPT | Performed by: PHYSICIAN ASSISTANT

## 2023-04-07 PROCEDURE — 85027 COMPLETE CBC AUTOMATED: CPT | Performed by: PHYSICIAN ASSISTANT

## 2023-04-07 PROCEDURE — 99396 PREV VISIT EST AGE 40-64: CPT | Mod: 25 | Performed by: PHYSICIAN ASSISTANT

## 2023-04-07 RX ORDER — ATORVASTATIN CALCIUM 40 MG/1
40 TABLET, FILM COATED ORAL DAILY
Qty: 90 TABLET | Refills: 3 | Status: SHIPPED | OUTPATIENT
Start: 2023-04-07 | End: 2024-04-09

## 2023-04-07 ASSESSMENT — ENCOUNTER SYMPTOMS
COUGH: 0
HEMATOCHEZIA: 0
HEADACHES: 0
WEAKNESS: 0
HEMATURIA: 0
DIARRHEA: 1
WHEEZING: 0
CONSTIPATION: 0
LIGHT-HEADEDNESS: 0
FREQUENCY: 0
SHORTNESS OF BREATH: 0
ABDOMINAL PAIN: 0
CHILLS: 0
FATIGUE: 1
EYES NEGATIVE: 1
MUSCULOSKELETAL NEGATIVE: 1
DIZZINESS: 0
FLANK PAIN: 0

## 2023-04-07 ASSESSMENT — PAIN SCALES - GENERAL: PAINLEVEL: NO PAIN (0)

## 2023-04-07 NOTE — PROGRESS NOTES
SUBJECTIVE:   CC: Agus is an 57 year old who presents for preventative health visit.       4/7/2023     9:13 AM   Additional Questions   Roomed by Micaela Yung   Patient has been advised of split billing requirements and indicates understanding: Yes  Agus is a pleasant 57-year-old male who presents to the clinic today for annual physical.  Past medical history significant for hyperlipidemia, current smoker, and vitamin D deficiency and erectile dysfunction.  He has been off his cholesterol medications for about 6 months as he ran out of medications.  He was on them for about a year and tolerated it well.  He is a current smoker.  Smoking about a pack a day.    He has been having some diarrhea for the last 2 months.  He is cut out diet Mountain Dew and added a probiotic and since then his stools have become more formed but are still soft.  He states the caliber of his stools have also gotten smaller.  He denies any abdominal pain.  He does note blood when he is wiping his stool but no blood within the stool itself.  He notes he does have hemorrhoids.  No mucus in his stool.    Is also been having chronic fatigue for over a year.  He feels he is sleeping well.  He feels he gets fatigued throughout the day.  He is finds himself using energy drinks to help with his fatigue.  He denies any chest pain, shortness of breath, or palpitations associated with the fatigue.  He feels that his mood is stable overall.    Healthy Habits:     Getting at least 3 servings of Calcium per day:  Yes    Bi-annual eye exam:  Yes    Dental care twice a year:  Yes    Sleep apnea or symptoms of sleep apnea:  Daytime drowsiness    Diet:  Regular (no restrictions)    Frequency of exercise:  None    Taking medications regularly:  Yes    Medication side effects:  Not applicable    PHQ-2 Total Score: 0    Additional concerns today:  No  Diarrhea  Associated symptoms include fatigue. Pertinent negatives include no abdominal pain, chest pain,  chills, congestion, coughing, headaches or weakness.   Allergies  Associated symptoms include fatigue. Pertinent negatives include no abdominal pain, chest pain, chills, congestion, coughing, headaches or weakness.       Today's PHQ-2 Score:       4/7/2023     9:00 AM   PHQ-2 ( 1999 [employer])   Q1: Little interest or pleasure in doing things 0   Q2: Feeling down, depressed or hopeless 0   PHQ-2 Score 0   Q1: Little interest or pleasure in doing things Not at all    Not at all   Q2: Feeling down, depressed or hopeless Not at all    Not at all   PHQ-2 Score 0    0           Social History     Tobacco Use     Smoking status: Every Day     Packs/day: 1.00     Years: 35.00     Pack years: 35.00     Types: Cigarettes     Passive exposure: Never     Smokeless tobacco: Never   Vaping Use     Vaping status: Never Used     Passive vaping exposure: Yes   Substance Use Topics     Alcohol use: Yes     Alcohol/week: 12.0 standard drinks of alcohol     Types: 7 drink(s) per week             4/7/2023     9:00 AM   Alcohol Use   Prescreen: >3 drinks/day or >7 drinks/week? Yes   AUDIT SCORE  8       Last PSA:   Prostate Specific Antigen Screen   Date Value Ref Range Status   01/08/2021 2.7 0.0 - 3.5 ng/mL Final       Reviewed orders with patient. Reviewed health maintenance and updated orders accordingly - Yes  Lab work is in process    Reviewed and updated as needed this visit by clinical staff   Tobacco  Allergies  Meds              Reviewed and updated as needed this visit by Provider                 Past Medical History:   Diagnosis Date     Hypertriglyceridemia      Other and unspecified hyperlipidemia      Tobacco abuse      Tobacco use disorder      Unspecified vitamin D deficiency       Past Surgical History:   Procedure Laterality Date     WISDOM TOOTH EXTRACTION         Review of Systems   Constitutional: Positive for fatigue. Negative for chills.   HENT: Negative.  Negative for congestion.    Eyes: Negative.   "  Respiratory: Negative for cough, shortness of breath and wheezing.    Cardiovascular: Negative for chest pain.   Gastrointestinal: Positive for diarrhea. Negative for abdominal pain, constipation and hematochezia.   Genitourinary: Negative for decreased urine volume, flank pain, frequency, hematuria and urgency.   Musculoskeletal: Negative.    Skin: Negative.    Neurological: Negative for dizziness, weakness, light-headedness and headaches.       OBJECTIVE:   BP (!) 140/78   Pulse 72   Temp 97.8  F (36.6  C) (Oral)   Resp 16   Ht 1.803 m (5' 11\")   Wt 92.4 kg (203 lb 12.8 oz)   SpO2 95%   BMI 28.42 kg/m      Physical Exam  Vitals and nursing note reviewed.   Constitutional:       General: He is not in acute distress.     Appearance: Normal appearance. He is well-developed and well-groomed. He is not ill-appearing or toxic-appearing.   HENT:      Head: Normocephalic and atraumatic.      Right Ear: Tympanic membrane, ear canal and external ear normal.      Left Ear: Tympanic membrane, ear canal and external ear normal.      Mouth/Throat:      Lips: Pink.      Mouth: Mucous membranes are moist.      Palate: No mass.      Pharynx: Oropharynx is clear. Uvula midline.      Tonsils: No tonsillar exudate or tonsillar abscesses.   Eyes:      General: Lids are normal.         Right eye: No discharge.         Left eye: No discharge.   Neck:      Trachea: Trachea normal.   Cardiovascular:      Rate and Rhythm: Normal rate and regular rhythm.      Heart sounds: S1 normal and S2 normal. No murmur heard.  Pulmonary:      Effort: Pulmonary effort is normal.      Breath sounds: Normal breath sounds and air entry.   Abdominal:      General: Bowel sounds are normal. There is no distension.      Palpations: Abdomen is soft.      Tenderness: There is no abdominal tenderness. There is no guarding or rebound.   Musculoskeletal:      Cervical back: Full passive range of motion without pain and neck supple.      Right lower leg: No " edema.      Left lower leg: No edema.   Lymphadenopathy:      Cervical: No cervical adenopathy.   Skin:     General: Skin is warm and dry.      Findings: No lesion or rash.   Neurological:      General: No focal deficit present.      Mental Status: He is alert.      Cranial Nerves: No cranial nerve deficit.      Gait: Gait is intact.      Deep Tendon Reflexes:      Reflex Scores:       Patellar reflexes are 2+ on the right side and 2+ on the left side.  Psychiatric:         Attention and Perception: Attention normal.         Mood and Affect: Mood normal.         Speech: Speech normal.         ASSESSMENT/PLAN:       ICD-10-CM    1. Annual physical exam  Z00.00 CBC with platelets     Comprehensive metabolic panel (BMP + Alb, Alk Phos, ALT, AST, Total. Bili, TP)     Lipid panel reflex to direct LDL Fasting     PSA, screen     Hemoglobin A1c     CBC with platelets     Comprehensive metabolic panel (BMP + Alb, Alk Phos, ALT, AST, Total. Bili, TP)     Lipid panel reflex to direct LDL Fasting     PSA, screen     Hemoglobin A1c      2. Screening for diabetes mellitus  Z13.1 Hemoglobin A1c     Hemoglobin A1c      3. Screening for prostate cancer  Z12.5 PSA, screen     PSA, screen      4. Hypertriglyceridemia  E78.1 Lipid panel reflex to direct LDL Fasting     Lipid panel reflex to direct LDL Fasting      5. Vitamin D deficiency  E55.9 Vitamin D deficiency screening     Vitamin D deficiency screening      6. Tobacco abuse  Z72.0 SMOKING CESSATION COUNSELING 3-10 MIN     Prof fee: Shared Decision Making for Lung Cancer Screening     CT Chest Lung Cancer Scrn Low Dose wo      7. Screen for colon cancer  Z12.11 Colonoscopy Screening  Referral      8. Hyperlipidemia, unspecified hyperlipidemia type  E78.5 atorvastatin (LIPITOR) 40 MG tablet     Lipid panel reflex to direct LDL Fasting      9. Personal history of tobacco use  Z87.891       10. Other fatigue  R53.83 Vitamin D deficiency screening     Testosterone Free and  Total     TSH with free T4 reflex     Vitamin D deficiency screening     Testosterone Free and Total     TSH with free T4 reflex        #1 annual physical  We will update screening labs including a CBC, complete metabolic panel, lipid, PSA and an A1c.  He will update shingles and pneumonia vaccine today    #2 hyperlipidemia  Last lipid panel 1/2021 was quite elevated.  LDL was 204, triglyceride 145, and HDL 51.  He has been off of Lipitor for about 6 months.  This was refilled today.  We discussed diet and exercise today.  He is fasting so we will check a fasting lipid panel today.  Recheck lipids in 3 months.    #3 elevated blood pressure without the diagnosis of hypertension  Borderline blood pressure today 148/78 on recheck did come down to 140/78.  To keep an eye at this.  I do not think we need medications at this time.  Decrease salt and alcohol intake.  Increase activity.  He is to monitor this at home.  If this worsens he is to follow-up.    #4 current smoker  He is smoking a pack a day.  Smokes again cessation was discussed    #5 fatigue  Fatigue throughout the day the for the past year.  He denies any chest pain, shortness of breath, lightheaded or dizziness associate with the fatigue.  Usually more so in the afternoon.  He has lost some weight.  we will check labs including a testosterone, vitamin D, and a TSH today.  If labs are unremarkable we could consider doing a sleep study for possible sleep apnea that is contributing to his fatigue during the evening.    #6 diarrhea  Continue with probiotics.  Continue cutting out Mountain Dew as these things have helped.  He denies any pain or fevers.  He has noted some blood in his stool along with the change of the caliber of the stool so it would be wise to set him up for a colonoscopy.  We will get this ordered.  He is to monitor symptoms in the meantime.    #7 lung cancer screening  He is willing to do a low-dose CT for lung cancer screening.    #8 prostate  "cancer screening  We will check a PSA level    #9 colon cancer screening  Referral to [facility] placed for colonoscopy for colon cancer screening    #10 vitamin D deficiency  We will check a vitamin D level    Patient has been advised of split billing requirements and indicates understanding: Yes      COUNSELING:   Reviewed preventive health counseling, as reflected in patient instructions       Regular exercise       Healthy diet/nutrition       Vision screening       Colorectal cancer screening       Prostate cancer screening      BMI:   Estimated body mass index is 28.42 kg/m  as calculated from the following:    Height as of this encounter: 1.803 m (5' 11\").    Weight as of this encounter: 92.4 kg (203 lb 12.8 oz).   Weight management plan: Discussed healthy diet and exercise guidelines      He reports that he has been smoking cigarettes. He has a 35.00 pack-year smoking history. He has never been exposed to tobacco smoke. He has never used smokeless tobacco.  Nicotine/Tobacco Cessation Plan:   Information offered: Patient not interested at this time            MARINO [facility]  "

## 2023-04-07 NOTE — PATIENT INSTRUCTIONS
Please call the radiology dep at [facility] to schedule your test today at [phone]        Lung Cancer Screening   Frequently Asked Questions  If you are at high-risk for lung cancer, getting screened with low-dose computed tomography (LDCT) every year can help save your life. This handout offers answers to some of the most common questions about lung cancer screening. If you have other questions, please call [phone] ([phone]).     What is it?  Lung cancer screening uses special X-ray technology to create an image of your lung tissue. The exam is quick and easy and takes less than 10 seconds. We don t give you any medicine or use any needles. You can eat before and after the exam. You don t need to change your clothes as long as the clothing on your chest doesn t contain metal. But, you do need to be able to hold your breath for at least 6 seconds during the exam.    What is the goal of lung cancer screening?  The goal of lung cancer screening is to save lives. Many times, lung cancer is not found until a person starts having physical symptoms. Lung cancer screening can help detect lung cancer in the earliest stages when it may be easier to treat.    Who should be screened for lung cancer?  We suggest lung cancer screening for anyone who is at high-risk for lung cancer. You are in the high-risk group if you:     are between the ages of 55 and 79, and   have smoked at least 1 pack of cigarettes a day for 20 or more years, and   still smoke or have quit within the past 15 years.    However, if you have a new cough or shortness of breath, you should talk to your doctor before being screened.    Why does it matter if I have symptoms?  Certain symptoms can be a sign that you have a condition in your lungs that should be checked and treated by your doctor. These symptoms include fever, chest pain, a new or changing cough, shortness of breath that you have never felt before, coughing up blood or  unexplained weight loss. Having any of these symptoms can greatly affect the results of lung cancer screening.       Should all smokers get an LDCT lung cancer screening exam?  It depends. Lung cancer screening is for a very specific group of men and women who have a history of heavy smoking over a long period of time (see  Who should be screened for lung cancer  above).  I am in the high-risk group, but have been diagnosed with cancer in the past. Is LDCT lung cancer screening right for me?  In some cases, you should not have LDCT lung screening, such as when your doctor is already following your cancer with CT scan studies. Your doctor will help you decide if LDCT lung screening is right for you.  Do I need to have a screening exam every year?  Yes. If you are in the high-risk group described earlier, you should get an LDCT lung cancer screening exam every year until you are 79, or are no longer willing or able to undergo screening and possible procedures to diagnose and treat lung cancer.  How effective is LDCT at preventing death from lung cancer?  Studies have shown that LDCT lung cancer screening can lower the risk of death from lung cancer by 20 percent in people who are at high-risk.  What are the risks?  There are some risks and limitations of LDCT lung cancer screening. We want to make sure you understand the risks and benefits, so please let us know if you have any questions. Your doctor may want to talk with you more about these risks.   Radiation exposure: As with any exam that uses radiation, there is a very small increased risk of cancer. The amount of radiation in LDCT is small--about the same amount a person would get from a mammogram. Your doctor orders the exam when he or she feels the potential benefits outweigh the risks.   False negatives: No test is perfect, including LDCT. It is possible that you may have a medical condition, including lung cancer, that is not found during your exam. This is  called a false negative result.   False positives and more testing: LDCT very often finds something in the lung that could be cancer, but in fact is not. This is called a false positive result. False positive tests often cause anxiety. To make sure these findings are not cancer, you may need to have more tests. These tests will be done only if you give us permission. Sometimes patients need a treatment that can have side effects, such as a biopsy. For more information on false positives, see  What can I expect from the results?    Findings not related to lung cancer: Your LDCT exam also takes pictures of areas of your body next to your lungs. In a very small number of cases, the CT scan will show an abnormal finding in one of these areas, such as your kidneys, adrenal glands, liver or thyroid. This finding may not be serious, but you may need more tests. Your doctor can help you decide what other tests you may need, if any.  What can I expect from the results?  About 1 out of 4 LDCT exams will find something that may need more tests. Most of the time, these findings are lung nodules. Lung nodules are very small collections of tissue in the lung. These nodules are very common, and the vast majority--more than 97 percent--are not cancer (benign). Most are normal lymph nodes or small areas of scarring from past infections.  But, if a small lung nodule is found to be cancer, the cancer can be cured more than 90 percent of the time. To know if the nodule is cancer, we may need to get more images before your next yearly screening exam. If the nodule has suspicious features (for example, it is large, has an odd shape or grows over time), we will refer you to a specialist for further testing.  Will my doctor also get the results?  Yes. Your doctor will get a copy of your results.  Is it okay to keep smoking now that there s a cancer screening exam?  No. Tobacco is one of the strongest cancer-causing agents. It causes not  only lung cancer, but other cancers and cardiovascular (heart) diseases as well. The damage caused by smoking builds over time. This means that the longer you smoke, the higher your risk of disease. While it is never too late to quit, the sooner you quit, the better.  Where can I find help to quit smoking?  The best way to prevent lung cancer is to stop smoking. If you have already quit smoking, congratulations and keep it up! For help on quitting smoking, please call [facility] at 9-092-QUITNOW (1-975.986.1218) or the American [employer] at 1-121.898.3498 to find local resources near you.  One-on-one health coaching:  If you d prefer to work individually with a health care provider on tobacco cessation, we offer:     Medication Therapy Management:  Our specially trained pharmacists work closely with you and your doctor to help you quit smoking.  Call [phone] or [phone] (toll free).

## 2023-04-07 NOTE — PROGRESS NOTES
Lung Cancer Screening Shared Decision Making Visit     Oscar Mckay, a 57 year old male, is eligible for lung cancer screening    History   Smoking Status     Every Day     Packs/day: 1.00     Years: 35.00     Types: Cigarettes   Smokeless Tobacco     Never       I have discussed with patient the risks and benefits of screening for lung cancer with low-dose CT.     The risks include:    radiation exposure: one low dose chest CT has as much ionizing radiation as about 15 chest x-rays, or 6 months of background radiation living in [place]      false positives: most findings/nodules are NOT cancer, but some might still require additional diagnostic evaluation, including biopsy    over-diagnosis: some slow growing cancers that might never have been clinically significant will be detected and treated unnecessarily     The benefit of early detection of lung cancer is contingent upon adherence to annual screening or more frequent follow up if indicated.     Furthermore, to benefit from screening, Oscar must be willing and able to undergo diagnostic procedures, if indicated. Although no specific guide is available for determining severity of comorbidities, it is reasonable to withhold screening in patients who have greater mortality risk from other diseases.     We did discuss that the best way to prevent lung cancer is to not smoke.    Some patients may value a numeric estimation of lung cancer risk when evaluating if lung cancer screening is right for them, here is one calculator:    ShouldIScreen  Answers for HPI/ROS submitted by the patient on 4/7/2023  Frequency of exercise:: None  Getting at least 3 servings of Calcium per day:: Yes  Diet:: Regular (no restrictions)  Taking medications regularly:: Yes  Medication side effects:: Not applicable  Bi-annual eye exam:: Yes  Dental care twice a year:: Yes  Sleep apnea or symptoms of sleep apnea:: Daytime drowsiness  abdominal pain: No  Blood in stool: No  Blood in  urine: No  chest pain: No  chills: No  congestion: No  constipation: No  cough: No  Additional concerns today:: No

## 2023-04-08 LAB
DEPRECATED CALCIDIOL+CALCIFEROL SERPL-MC: 44 UG/L (ref 20–75)
SHBG SERPL-SCNC: 43 NMOL/L (ref 11–80)

## 2023-04-11 LAB
TESTOST FREE SERPL-MCNC: 6.57 NG/DL
TESTOST SERPL-MCNC: 384 NG/DL (ref 240–950)

## 2023-04-21 ENCOUNTER — HOSPITAL ENCOUNTER (OUTPATIENT)
Dept: CT IMAGING | Facility: CLINIC | Age: 58
Discharge: HOME | End: 2023-04-21
Attending: PHYSICIAN ASSISTANT | Admitting: PHYSICIAN ASSISTANT
Payer: COMMERCIAL

## 2023-04-21 DIAGNOSIS — Z72.0 TOBACCO ABUSE: ICD-10-CM

## 2023-04-21 PROCEDURE — 71271 CT THORAX LUNG CANCER SCR C-: CPT

## 2024-03-08 ENCOUNTER — PATIENT OUTREACH (OUTPATIENT)
Dept: CARE COORDINATION | Facility: CLINIC | Age: 59
End: 2024-03-08
Payer: COMMERCIAL

## 2024-03-22 ENCOUNTER — PATIENT OUTREACH (OUTPATIENT)
Dept: CARE COORDINATION | Facility: CLINIC | Age: 59
End: 2024-03-22
Payer: COMMERCIAL

## 2024-04-09 DIAGNOSIS — E78.5 HYPERLIPIDEMIA, UNSPECIFIED HYPERLIPIDEMIA TYPE: ICD-10-CM

## 2024-04-09 RX ORDER — ATORVASTATIN CALCIUM 40 MG/1
40 TABLET, FILM COATED ORAL DAILY
Qty: 30 TABLET | Refills: 0 | Status: SHIPPED | OUTPATIENT
Start: 2024-04-09 | End: 2024-06-05

## 2024-04-10 NOTE — TELEPHONE ENCOUNTER
Called and spoke with pt. Pt understands due for appt before further refills, will work on getting appt scheduled.

## 2024-05-09 DIAGNOSIS — E78.5 HYPERLIPIDEMIA, UNSPECIFIED HYPERLIPIDEMIA TYPE: ICD-10-CM

## 2024-05-09 RX ORDER — ATORVASTATIN CALCIUM 40 MG/1
40 TABLET, FILM COATED ORAL DAILY
Qty: 30 TABLET | Refills: 0 | OUTPATIENT
Start: 2024-05-09

## 2024-05-09 NOTE — TELEPHONE ENCOUNTER
Patient scheduled for physical. Pt states he should have enough medicine to get him until appointment since he is not good at taking it.     Micaela Yung CMA.

## 2024-06-05 ENCOUNTER — OFFICE VISIT (OUTPATIENT)
Dept: FAMILY MEDICINE | Facility: CLINIC | Age: 59
End: 2024-06-05
Payer: COMMERCIAL

## 2024-06-05 VITALS
TEMPERATURE: 98.7 F | HEIGHT: 71 IN | HEART RATE: 78 BPM | OXYGEN SATURATION: 97 % | RESPIRATION RATE: 14 BRPM | BODY MASS INDEX: 32.9 KG/M2 | DIASTOLIC BLOOD PRESSURE: 104 MMHG | WEIGHT: 235 LBS | SYSTOLIC BLOOD PRESSURE: 140 MMHG

## 2024-06-05 DIAGNOSIS — Z12.2 SCREENING FOR LUNG CANCER: ICD-10-CM

## 2024-06-05 DIAGNOSIS — E78.5 HYPERLIPIDEMIA, UNSPECIFIED HYPERLIPIDEMIA TYPE: ICD-10-CM

## 2024-06-05 DIAGNOSIS — F52.8 OTHER SEXUAL DYSFUNCTION NOT DUE TO A SUBSTANCE OR KNOWN PHYSIOLOGICAL CONDITION: ICD-10-CM

## 2024-06-05 DIAGNOSIS — E55.9 VITAMIN D DEFICIENCY: ICD-10-CM

## 2024-06-05 DIAGNOSIS — Z12.11 SCREEN FOR COLON CANCER: ICD-10-CM

## 2024-06-05 DIAGNOSIS — R73.03 PREDIABETES: ICD-10-CM

## 2024-06-05 DIAGNOSIS — N40.1 BENIGN PROSTATIC HYPERPLASIA WITH NOCTURIA: ICD-10-CM

## 2024-06-05 DIAGNOSIS — R35.1 BENIGN PROSTATIC HYPERPLASIA WITH NOCTURIA: ICD-10-CM

## 2024-06-05 DIAGNOSIS — Z00.00 ANNUAL PHYSICAL EXAM: Primary | ICD-10-CM

## 2024-06-05 DIAGNOSIS — Z12.5 SCREENING FOR PROSTATE CANCER: ICD-10-CM

## 2024-06-05 DIAGNOSIS — I10 BENIGN ESSENTIAL HYPERTENSION: ICD-10-CM

## 2024-06-05 DIAGNOSIS — R91.8 PULMONARY NODULES: ICD-10-CM

## 2024-06-05 LAB
ERYTHROCYTE [DISTWIDTH] IN BLOOD BY AUTOMATED COUNT: 12.9 % (ref 10–15)
HBA1C MFR BLD: 5.7 % (ref 0–5.6)
HCT VFR BLD AUTO: 45.7 % (ref 40–53)
HGB BLD-MCNC: 15.1 G/DL (ref 13.3–17.7)
MCH RBC QN AUTO: 32.8 PG (ref 26.5–33)
MCHC RBC AUTO-ENTMCNC: 33 G/DL (ref 31.5–36.5)
MCV RBC AUTO: 99 FL (ref 78–100)
PLATELET # BLD AUTO: 231 10E3/UL (ref 150–450)
RBC # BLD AUTO: 4.61 10E6/UL (ref 4.4–5.9)
WBC # BLD AUTO: 7.2 10E3/UL (ref 4–11)

## 2024-06-05 PROCEDURE — 80061 LIPID PANEL: CPT | Performed by: PHYSICIAN ASSISTANT

## 2024-06-05 PROCEDURE — 36415 COLL VENOUS BLD VENIPUNCTURE: CPT | Performed by: PHYSICIAN ASSISTANT

## 2024-06-05 PROCEDURE — 90471 IMMUNIZATION ADMIN: CPT | Performed by: PHYSICIAN ASSISTANT

## 2024-06-05 PROCEDURE — 80053 COMPREHEN METABOLIC PANEL: CPT | Performed by: PHYSICIAN ASSISTANT

## 2024-06-05 PROCEDURE — 99396 PREV VISIT EST AGE 40-64: CPT | Mod: 25 | Performed by: PHYSICIAN ASSISTANT

## 2024-06-05 PROCEDURE — 90750 HZV VACC RECOMBINANT IM: CPT | Performed by: PHYSICIAN ASSISTANT

## 2024-06-05 PROCEDURE — 99214 OFFICE O/P EST MOD 30 MIN: CPT | Mod: 25 | Performed by: PHYSICIAN ASSISTANT

## 2024-06-05 PROCEDURE — 83036 HEMOGLOBIN GLYCOSYLATED A1C: CPT | Performed by: PHYSICIAN ASSISTANT

## 2024-06-05 PROCEDURE — G0103 PSA SCREENING: HCPCS | Performed by: PHYSICIAN ASSISTANT

## 2024-06-05 PROCEDURE — 85027 COMPLETE CBC AUTOMATED: CPT | Performed by: PHYSICIAN ASSISTANT

## 2024-06-05 RX ORDER — ATORVASTATIN CALCIUM 40 MG/1
40 TABLET, FILM COATED ORAL DAILY
Qty: 90 TABLET | Refills: 3 | Status: SHIPPED | OUTPATIENT
Start: 2024-06-05

## 2024-06-05 RX ORDER — LOSARTAN POTASSIUM 25 MG/1
25 TABLET ORAL DAILY
Qty: 90 TABLET | Refills: 3 | Status: SHIPPED | OUTPATIENT
Start: 2024-06-05

## 2024-06-05 RX ORDER — TAMSULOSIN HYDROCHLORIDE 0.4 MG/1
0.4 CAPSULE ORAL DAILY
Qty: 90 CAPSULE | Refills: 3 | Status: SHIPPED | OUTPATIENT
Start: 2024-06-05

## 2024-06-05 SDOH — HEALTH STABILITY: PHYSICAL HEALTH: ON AVERAGE, HOW MANY DAYS PER WEEK DO YOU ENGAGE IN MODERATE TO STRENUOUS EXERCISE (LIKE A BRISK WALK)?: 4 DAYS

## 2024-06-05 SDOH — HEALTH STABILITY: PHYSICAL HEALTH: ON AVERAGE, HOW MANY MINUTES DO YOU ENGAGE IN EXERCISE AT THIS LEVEL?: 10 MIN

## 2024-06-05 ASSESSMENT — ENCOUNTER SYMPTOMS
ABDOMINAL PAIN: 0
COUGH: 0
BACK PAIN: 0
VOMITING: 0
SORE THROAT: 0
DYSURIA: 0
CHILLS: 0
COLOR CHANGE: 0
SEIZURES: 0
EYE PAIN: 0
SHORTNESS OF BREATH: 0
ARTHRALGIAS: 0
FEVER: 0
HEMATURIA: 0
PALPITATIONS: 0

## 2024-06-05 ASSESSMENT — SOCIAL DETERMINANTS OF HEALTH (SDOH): HOW OFTEN DO YOU GET TOGETHER WITH FRIENDS OR RELATIVES?: TWICE A WEEK

## 2024-06-05 NOTE — PROGRESS NOTES
Preventive Care Visit  [facility]  Sarbjit Muñoz PA-C, Family Medicine  Jun 5, 2024      Assessment & Plan     Annual physical exam  Will update screening labs today.  - CBC with platelets; Future  - Comprehensive metabolic panel (BMP + Alb, Alk Phos, ALT, AST, Total. Bili, TP); Future  - CBC with platelets  - Comprehensive metabolic panel (BMP + Alb, Alk Phos, ALT, AST, Total. Bili, TP)    Benign essential hypertension  Blood pressure is elevated at 140/104.  It has been a little elevated during his last visits.  Will start him on losartan 25 mg daily.  Side effects of the medication discussed.  Recommended nurse blood pressure recheck in 2 to 3 weeks.  We also discussed watching diet and stay active.  - losartan (COZAAR) 25 MG tablet; Take 1 tablet (25 mg) by mouth daily    Hyperlipidemia, unspecified hyperlipidemia type  Will check a fasting lipid panel today.  Continue with Lipitor 40 mg daily.  - Lipid panel reflex to direct LDL Fasting; Future  - atorvastatin (LIPITOR) 40 MG tablet; Take 1 tablet (40 mg) by mouth daily  - Lipid panel reflex to direct LDL Fasting    Prediabetes  Last A1c mildly elevated at 5.8.  Will check an A1c  - Hemoglobin A1c; Future  - Hemoglobin A1c    Screening for prostate cancer  PSA a year ago was 3.4 up from 2.7 a year prior.  Recommended rechecking this 3 months later but he was lost to follow-up.  We will recheck PSA level today.  He does endorse urinary frequency, urgency along with some mild hesitation.  - PSA, screen; Future  - PSA, screen    Vitamin D deficiency  Will check a vitamin D level    Screen for colon cancer  He is due for colonoscopy and will call with [facility] to get this set up    Other sexual dysfunction not due to a substance or known physiological condition  No changes at this time    Screening for lung cancer  Pulmonary nodules  He is a current smoker and knows that he needs to cut back.  Would help with his blood pressure.   "He did undergo CT of his lungs for lung cancer screening and did not show multiple pulmonary nodules.  No concerns for malignancy at that time.  He is not having any symptoms.  Will repeat CT.  - CT Chest Lung Cancer Scrn Low Dose wo; Future    Benign prostatic hyperplasia with nocturia  3 to 4 months of's been noticing urinary frequency, hesitation and dribbling.  He is also getting up frequently at night.  We will have him start Flomax at bedtime.  Recommended starting this 2 to 3 weeks after he starts losartan to prevent any interactions.  - tamsulosin (FLOMAX) 0.4 MG capsule; Take 1 capsule (0.4 mg) by mouth daily    Patient has been advised of split billing requirements and indicates understanding: Yes        Nicotine/Tobacco Cessation  He reports that he has been smoking cigarettes. He has a 35 pack-year smoking history. He has never been exposed to tobacco smoke. He has never used smokeless tobacco.  Nicotine/Tobacco Cessation Plan  Information offered: Patient not interested at this time      BMI  Estimated body mass index is 32.78 kg/m  as calculated from the following:    Height as of this encounter: 1.803 m (5' 11\").    Weight as of this encounter: 106.6 kg (235 lb).   Weight management plan: Discussed healthy diet and exercise guidelines    Counseling  Appropriate preventive services were discussed with this patient, including applicable screening as appropriate for fall prevention, nutrition, physical activity, Tobacco-use cessation, weight loss and cognition.  Checklist reviewing preventive services available has been given to the patient.  Reviewed patient's diet, addressing concerns and/or questions.   He is at risk for psychosocial distress and has been provided with information to reduce risk.         Diana Goldsmith is a 58 year old, presenting for the following:  Physical (Fasting Physical. Pt reports he has a hard time remembering to take Atorvastain daily.)        6/5/2024    10:47 AM "   Additional Questions   Roomed by Maria C Ramirez   Accompanied by none        Health Care Directive  Patient does not have a Health Care Directive or Living Will: Discussed advance care planning with patient; however, patient declined at this time.    Agus is a pleasant 58-year-old male who presents to the clinic today for annual physical.  Past medical history significant for hypertension, prediabetes, current smoker, BPH, and vitamin D deficiency.  Overall is doing well.    He is currently on Lipitor 40 mg daily.  He is tolerating the medication well.  Cholesterol was elevated with an LDL of 178.  He states he does miss some doses of his cholesterol medication.  During his annual physical PSA 4/23 was 3.4 up from 2.7.  Due to the mild increase in years span I did recommend checking this in 3 months but he was lost to follow-up.    He has been having worsening urinary frequency, urgency and decreased urinary flow over the last 3 or 4 months.  He does not have any burning or urinary frequency.    He does have pulmonary nodules that was noted on CT for lung cancer purposes.  Lung cancer screening was negative at that time.  He is a current smoker and knows that he needs to try to cut back.  He is not ready to cut back at this time            6/5/2024   General Health   How would you rate your overall physical health? Good   Feel stress (tense, anxious, or unable to sleep) Only a little   (!) STRESS CONCERN      6/5/2024   Nutrition   Three or more servings of calcium each day? (!) I DON'T KNOW   Diet: Regular (no restrictions)   How many servings of fruit and vegetables per day? (!) 0-1   How many sweetened beverages each day? 0-1         6/5/2024   Exercise   Days per week of moderate/strenous exercise 4 days   Average minutes spent exercising at this level 10 min         6/5/2024   Social Factors   Frequency of gathering with friends or relatives Twice a week   Worry food won't last until get money to buy more No    Food not last or not have enough money for food? No   Do you have housing?  Yes   Are you worried about losing your housing? No   Lack of transportation? No   Unable to get utilities (heat,electricity)? No         6/5/2024   Fall Risk   Fallen 2 or more times in the past year? No   Trouble with walking or balance? No          6/5/2024   Dental   Dentist two times every year? Yes         6/5/2024   TB Screening   Were you born outside of the [place]? No         Today's PHQ-2 Score:       6/5/2024    10:46 AM   PHQ-2 ( 1999 [employer])   Q1: Little interest or pleasure in doing things 0   Q2: Feeling down, depressed or hopeless 0   PHQ-2 Score 0   Q1: Little interest or pleasure in doing things Not at all   Q2: Feeling down, depressed or hopeless Not at all   PHQ-2 Score 0           6/5/2024   Substance Use   Alcohol more than 3/day or more than 7/wk No   Do you use any other substances recreationally? No     Social History     Tobacco Use    Smoking status: Every Day     Current packs/day: 1.00     Average packs/day: 1 pack/day for 35.0 years (35.0 ttl pk-yrs)     Types: Cigarettes     Passive exposure: Never    Smokeless tobacco: Never   Vaping Use    Vaping status: Never Used   Substance Use Topics    Alcohol use: Yes     Alcohol/week: 12.0 standard drinks of alcohol     Types: 7 drink(s) per week    Drug use: No           6/5/2024   STI Screening   New sexual partner(s) since last STI/HIV test? No   Last PSA:   Prostate Specific Antigen Screen   Date Value Ref Range Status   04/07/2023 3.43 0.00 - 3.50 ng/mL Final   01/08/2021 2.7 0.0 - 3.5 ng/mL Final     ASCVD Risk   The 10-year ASCVD risk score (Rupesh SANTIAGO, et al., 2019) is: 16.2%    Values used to calculate the score:      Age: 58 years      Sex: Male      Is Non- : No      Diabetic: No      Tobacco smoker: Yes      Systolic Blood Pressure: 140 mmHg      Is BP treated: No      HDL Cholesterol: 49 mg/dL      Total Cholesterol: 248  "mg/dL         Reviewed and updated as needed this visit by Provider                    Past Medical History:   Diagnosis Date    Hypertriglyceridemia     Other and unspecified hyperlipidemia     Tobacco abuse     Tobacco use disorder     Unspecified vitamin D deficiency      Past Surgical History:   Procedure Laterality Date    WISDOM TOOTH EXTRACTION         Review of Systems   Constitutional:  Negative for chills and fever.   HENT:  Negative for ear pain and sore throat.    Eyes:  Negative for pain and visual disturbance.   Respiratory:  Negative for cough and shortness of breath.    Cardiovascular:  Negative for chest pain and palpitations.   Gastrointestinal:  Negative for abdominal pain and vomiting.   Genitourinary:  Negative for dysuria and hematuria.   Musculoskeletal:  Negative for arthralgias and back pain.   Skin:  Negative for color change and rash.   Neurological:  Negative for seizures and syncope.   All other systems reviewed and are negative.           Objective    Exam  BP (!) 140/104 (BP Location: Right arm, Patient Position: Sitting, Cuff Size: Adult Regular)   Pulse 78   Temp 98.7  F (37.1  C) (Oral)   Resp 14   Ht 1.803 m (5' 11\")   Wt 106.6 kg (235 lb)   SpO2 97%   BMI 32.78 kg/m     Estimated body mass index is 32.78 kg/m  as calculated from the following:    Height as of this encounter: 1.803 m (5' 11\").    Weight as of this encounter: 106.6 kg (235 lb).    Physical Exam  Vitals and nursing note reviewed.   Constitutional:       General: He is not in acute distress.     Appearance: Normal appearance. He is well-developed and well-groomed. He is not ill-appearing or toxic-appearing.   HENT:      Head: Normocephalic and atraumatic.      Right Ear: Tympanic membrane, ear canal and external ear normal.      Left Ear: Tympanic membrane, ear canal and external ear normal.      Mouth/Throat:      Lips: Pink.      Mouth: Mucous membranes are moist.      Palate: No mass.      Pharynx: " Oropharynx is clear. Uvula midline.      Tonsils: No tonsillar exudate or tonsillar abscesses.   Eyes:      General: Lids are normal.         Right eye: No discharge.         Left eye: No discharge.   Neck:      Trachea: Trachea normal.   Cardiovascular:      Rate and Rhythm: Normal rate and regular rhythm.      Heart sounds: S1 normal and S2 normal. No murmur heard.  Pulmonary:      Effort: Pulmonary effort is normal.      Breath sounds: Normal breath sounds and air entry.   Abdominal:      General: Bowel sounds are normal. There is no distension.      Palpations: Abdomen is soft.      Tenderness: There is no abdominal tenderness. There is no guarding or rebound.   Musculoskeletal:      Cervical back: Full passive range of motion without pain and neck supple.      Right lower leg: No edema.      Left lower leg: No edema.   Lymphadenopathy:      Cervical: No cervical adenopathy.   Skin:     General: Skin is warm and dry.      Findings: No lesion or rash.   Neurological:      General: No focal deficit present.      Mental Status: He is alert.      Cranial Nerves: No cranial nerve deficit.      Gait: Gait is intact.      Deep Tendon Reflexes:      Reflex Scores:       Patellar reflexes are 2+ on the right side and 2+ on the left side.  Psychiatric:         Attention and Perception: Attention normal.         Mood and Affect: Mood normal.         Speech: Speech normal.             Signed Electronically by: Sarbjit Muñoz PA-C

## 2024-06-05 NOTE — LETTER
Marielena 10, 2024      Agus Mckay  [id number] [facility] [id number]        Dear ,    We are writing to inform you of your test results.  Hello -      Here are my comments about your recent results:   -Normal red blood cell (hgb) levels, normal white blood cell count and normal platelet levels.   -PSA (prostate specific antigen) test is normal.  This indicates a low likelihood of prostate cancer.  ADVISE: rechecking this in 1 year.   -Cholesterol levels are at your goal levels.  ADVISE: continuing your medication, a regular exercise program with at least 150 minutes of aerobic exercise per week, and eating a low saturated fat/low carbohydrate diet.  Also, you should recheck this fasting cholesterol panel in 12 months.   -Liver and gallbladder tests are normal (ALT,AST, Alk phos, bilirubin), kidney function is normal (Cr, GFR), sodium is normal, potassium is normal, calcium is normal, glucose is normal.   -A1C (test of diabetes control the last 2-3 months) is at your goal.      Please let us know if you have any questions or concerns.     Resulted Orders   CBC with platelets   Result Value Ref Range    WBC Count 7.2 4.0 - 11.0 10e3/uL    RBC Count 4.61 4.40 - 5.90 10e6/uL    Hemoglobin 15.1 13.3 - 17.7 g/dL    Hematocrit 45.7 40.0 - 53.0 %    MCV 99 78 - 100 fL    MCH 32.8 26.5 - 33.0 pg    MCHC 33.0 31.5 - 36.5 g/dL    RDW 12.9 10.0 - 15.0 %    Platelet Count 231 150 - 450 10e3/uL   Comprehensive metabolic panel (BMP + Alb, Alk Phos, ALT, AST, Total. Bili, TP)   Result Value Ref Range    Sodium 141 135 - 145 mmol/L      Comment:      Reference intervals for this test were updated on 09/26/2023 to more accurately reflect our healthy population. There may be differences in the flagging of prior results with similar values performed with this method. Interpretation of those prior results can be made in the context of the updated reference intervals.     Potassium 4.7 3.4 - 5.3 mmol/L    Carbon Dioxide  (CO2) 25 22 - 29 mmol/L    Anion Gap 12 7 - 15 mmol/L    Urea Nitrogen 17.7 6.0 - 20.0 mg/dL    Creatinine 0.98 0.67 - 1.17 mg/dL    GFR Estimate >90 >60 mL/min/1.73m2    Calcium 9.8 8.6 - 10.0 mg/dL    Chloride 104 98 - 107 mmol/L    Glucose 101 (H) 70 - 99 mg/dL    Alkaline Phosphatase 115 40 - 150 U/L    AST 28 0 - 45 U/L      Comment:      Reference intervals for this test were updated on 6/12/2023 to more accurately reflect our healthy population. There may be differences in the flagging of prior results with similar values performed with this method. Interpretation of those prior results can be made in the context of the updated reference intervals.    ALT 38 0 - 70 U/L      Comment:      Reference intervals for this test were updated on 6/12/2023 to more accurately reflect our healthy population. There may be differences in the flagging of prior results with similar values performed with this method. Interpretation of those prior results can be made in the context of the updated reference intervals.      Protein Total 7.6 6.4 - 8.3 g/dL    Albumin 4.5 3.5 - 5.2 g/dL    Bilirubin Total 0.6 <=1.2 mg/dL    Patient Fasting > 8hrs? Yes    Lipid panel reflex to direct LDL Fasting   Result Value Ref Range    Cholesterol 169 <200 mg/dL    Triglycerides 143 <150 mg/dL    Direct Measure HDL 46 >=40 mg/dL    LDL Cholesterol Calculated 94 <=100 mg/dL    Non HDL Cholesterol 123 <130 mg/dL    Patient Fasting > 8hrs? Yes     Narrative    Cholesterol  Desirable:  <200 mg/dL    Triglycerides  Normal:  Less than 150 mg/dL  Borderline High:  150-199 mg/dL  High:  200-499 mg/dL  Very High:  Greater than or equal to 500 mg/dL    Direct Measure HDL  Female:  Greater than or equal to 50 mg/dL   Male:  Greater than or equal to 40 mg/dL    LDL Cholesterol  Desirable:  <100mg/dL  Above Desirable:  100-129 mg/dL   Borderline High:  130-159 mg/dL   High:  160-189 mg/dL   Very High:  >= 190 mg/dL    Non HDL Cholesterol  Desirable:  130  mg/dL  Above Desirable:  130-159 mg/dL  Borderline High:  160-189 mg/dL  High:  190-219 mg/dL  Very High:  Greater than or equal to 220 mg/dL   PSA, screen   Result Value Ref Range    Prostate Specific Antigen Screen 3.38 0.00 - 3.50 ng/mL    Narrative    This result is obtained using the Roche Elecsys total PSA method on the cathy e801 immunoassay analyzer. Results obtained with different assay methods or kits cannot be used interchangeably.   Hemoglobin A1c   Result Value Ref Range    Hemoglobin A1C 5.7 (H) 0.0 - 5.6 %      Comment:      Normal <5.7%   Prediabetes 5.7-6.4%    Diabetes 6.5% or higher     Note: Adopted from ADA consensus guidelines.       If you have any questions or concerns, please call the clinic at the number listed above.       Sincerely,      Sarbjit Muñoz PA-C

## 2024-06-05 NOTE — PATIENT INSTRUCTIONS
Blood pressure recheck with nurse in clinic in 2 weeks.     Start losartan 25mg daily today. Start Flomax for urinary stream 2-3 weeks after starting losartan. Take Flomax at bedtime.     Please call the radiology dep at [facility] to schedule your test today at [phone]

## 2024-06-06 LAB
ALBUMIN SERPL BCG-MCNC: 4.5 G/DL (ref 3.5–5.2)
ALP SERPL-CCNC: 115 U/L (ref 40–150)
ALT SERPL W P-5'-P-CCNC: 38 U/L (ref 0–70)
ANION GAP SERPL CALCULATED.3IONS-SCNC: 12 MMOL/L (ref 7–15)
AST SERPL W P-5'-P-CCNC: 28 U/L (ref 0–45)
BILIRUB SERPL-MCNC: 0.6 MG/DL
BUN SERPL-MCNC: 17.7 MG/DL (ref 6–20)
CALCIUM SERPL-MCNC: 9.8 MG/DL (ref 8.6–10)
CHLORIDE SERPL-SCNC: 104 MMOL/L (ref 98–107)
CHOLEST SERPL-MCNC: 169 MG/DL
CREAT SERPL-MCNC: 0.98 MG/DL (ref 0.67–1.17)
DEPRECATED HCO3 PLAS-SCNC: 25 MMOL/L (ref 22–29)
EGFRCR SERPLBLD CKD-EPI 2021: >90 ML/MIN/1.73M2
FASTING STATUS PATIENT QL REPORTED: YES
FASTING STATUS PATIENT QL REPORTED: YES
GLUCOSE SERPL-MCNC: 101 MG/DL (ref 70–99)
HDLC SERPL-MCNC: 46 MG/DL
LDLC SERPL CALC-MCNC: 94 MG/DL
NONHDLC SERPL-MCNC: 123 MG/DL
POTASSIUM SERPL-SCNC: 4.7 MMOL/L (ref 3.4–5.3)
PROT SERPL-MCNC: 7.6 G/DL (ref 6.4–8.3)
PSA SERPL DL<=0.01 NG/ML-MCNC: 3.38 NG/ML (ref 0–3.5)
SODIUM SERPL-SCNC: 141 MMOL/L (ref 135–145)
TRIGL SERPL-MCNC: 143 MG/DL

## 2024-06-28 ENCOUNTER — ALLIED HEALTH/NURSE VISIT (OUTPATIENT)
Dept: FAMILY MEDICINE | Facility: CLINIC | Age: 59
End: 2024-06-28
Payer: COMMERCIAL

## 2024-06-28 VITALS
BODY MASS INDEX: 32.76 KG/M2 | HEART RATE: 102 BPM | WEIGHT: 234.9 LBS | RESPIRATION RATE: 16 BRPM | DIASTOLIC BLOOD PRESSURE: 82 MMHG | OXYGEN SATURATION: 95 % | SYSTOLIC BLOOD PRESSURE: 136 MMHG

## 2024-06-28 DIAGNOSIS — I10 HTN (HYPERTENSION): Primary | ICD-10-CM

## 2024-06-28 PROCEDURE — 99207 PR NO CHARGE NURSE ONLY: CPT

## 2024-06-28 NOTE — PROGRESS NOTES
Oscar Mckay is a 58 year old year old patient who comes in today for a Blood Pressure check because of new medication.  Vital Signs as repeated by /82  Patient is taking medication as prescribed  Patient is tolerating medications well.  Patient is not monitoring Blood Pressure at home.  Average readings if yes are N/A  Current complaints: none  Disposition:  patient instructed to continue taking medication as prescribed. He was also instructed to take BP daily at the same time daily, about an hour to hour and a half after taking BP medications.

## 2024-06-28 NOTE — PROGRESS NOTES
Oscar Mckay is a 58 year old patient who comes in today for a Blood Pressure check.  Initial BP:  BP (!) 140/90 (BP Location: Left arm, Patient Position: Sitting, Cuff Size: Adult Regular)   Pulse 102   Resp 16   Wt 106.5 kg (234 lb 14.4 oz)   SpO2 95%   BMI 32.76 kg/m       102  Disposition: Recheck      Oscar Mckay is a 58 year old patient who comes in today for a Blood Pressure check.  Initial BP:  BP (!) 140/90 (BP Location: Left arm, Patient Position: Sitting, Cuff Size: Adult Regular)   Pulse 102   Resp 16   Wt 106.5 kg (234 lb 14.4 oz)   SpO2 95%   BMI 32.76 kg/m       102  Disposition: BP elevated.  Triage RN notified, patient asked to wait

## 2024-06-28 NOTE — PROGRESS NOTES
Information relayed to patient. Denies questions at this time.    Bev Newsome RN  [facility]

## 2024-07-11 ENCOUNTER — HOSPITAL ENCOUNTER (OUTPATIENT)
Dept: CT IMAGING | Facility: CLINIC | Age: 59
Discharge: HOME | End: 2024-07-11
Attending: PHYSICIAN ASSISTANT | Admitting: PHYSICIAN ASSISTANT
Payer: COMMERCIAL

## 2024-07-11 DIAGNOSIS — Z12.2 SCREENING FOR LUNG CANCER: ICD-10-CM

## 2024-07-11 PROCEDURE — 71271 CT THORAX LUNG CANCER SCR C-: CPT

## 2024-08-29 ENCOUNTER — TRANSFERRED RECORDS (OUTPATIENT)
Dept: HEALTH INFORMATION MANAGEMENT | Facility: CLINIC | Age: 59
End: 2024-08-29
Payer: COMMERCIAL

## 2025-05-06 ENCOUNTER — PATIENT OUTREACH (OUTPATIENT)
Dept: CARE COORDINATION | Facility: CLINIC | Age: 60
End: 2025-05-06
Payer: COMMERCIAL

## 2025-05-08 ENCOUNTER — OFFICE VISIT (OUTPATIENT)
Dept: FAMILY MEDICINE | Facility: CLINIC | Age: 60
End: 2025-05-08
Payer: COMMERCIAL

## 2025-05-08 VITALS
DIASTOLIC BLOOD PRESSURE: 80 MMHG | HEART RATE: 79 BPM | HEIGHT: 71 IN | WEIGHT: 246 LBS | BODY MASS INDEX: 34.44 KG/M2 | OXYGEN SATURATION: 96 % | TEMPERATURE: 98.6 F | SYSTOLIC BLOOD PRESSURE: 146 MMHG | RESPIRATION RATE: 16 BRPM

## 2025-05-08 DIAGNOSIS — N40.1 BENIGN PROSTATIC HYPERPLASIA WITH NOCTURIA: ICD-10-CM

## 2025-05-08 DIAGNOSIS — I10 BENIGN ESSENTIAL HYPERTENSION: ICD-10-CM

## 2025-05-08 DIAGNOSIS — M72.2 PLANTAR FASCIITIS: Primary | ICD-10-CM

## 2025-05-08 DIAGNOSIS — R35.1 BENIGN PROSTATIC HYPERPLASIA WITH NOCTURIA: ICD-10-CM

## 2025-05-08 RX ORDER — TAMSULOSIN HYDROCHLORIDE 0.4 MG/1
0.8 CAPSULE ORAL EVERY EVENING
Qty: 180 CAPSULE | Refills: 1 | Status: SHIPPED | OUTPATIENT
Start: 2025-05-08

## 2025-05-08 ASSESSMENT — PAIN SCALES - GENERAL: PAINLEVEL_OUTOF10: MILD PAIN (3)

## 2025-05-08 NOTE — PROGRESS NOTES
"  Assessment & Plan     Plantar fasciitis  We discussed that with what he is describing and because of his job situation, the most likely thing is that he was experiencing plantar fasciitis.  We discussed that this condition will frequently reappear.  We discussed ibuprofen, gel inserts, good shoes for comfort and support.  We discussed that he could buy orthotics to wear at night to treat this condition.    Benign essential hypertension  Claims he took his losartan 25 mg only shortly before he came in.  Will hold off on changing dose and recommended he make sure to be seen in June.    Benign prostatic hyperplasia with nocturia  Asks for a doubling of his dose which I give him.  He would like to see if it improves his situation.  - tamsulosin (FLOMAX) 0.4 MG capsule  Dispense: 180 capsule; Refill: 1      We discussed that he is near due for his annual physical.  He was last seen in early June with his primary Braulio martinez for his last physical.  This is not scheduled yet and I recommended he do that on the way out.    The longitudinal plan of care for the diagnosis(es)/condition(s) as documented were addressed during this visit. Due to the added complexity in care, I will continue to support Agus in the subsequent management and with ongoing continuity of care.      Nicotine/Tobacco Cessation  He reports that he has been smoking cigarettes. He has a 35 pack-year smoking history. He has never been exposed to tobacco smoke. He has never used smokeless tobacco.  Nicotine/Tobacco Cessation Plan  Not discussed      BMI  Estimated body mass index is 34.31 kg/m  as calculated from the following:    Height as of this encounter: 1.803 m (5' 11\").    Weight as of this encounter: 111.6 kg (246 lb).       Follow-up   Annual physical with Leonardo in June.      Subjective   Agus is a 59 year old, presenting for the following health issues:  Foot Pain (Left foot pain, on going for 1 month, no known injury, heel and arch area once " "up and moving it gets better, morning worse ) and Medication Request (Increase Flomax )        5/8/2025    10:56 AM   Additional Questions   Roomed by niki lawler cma     History of Present Illness       Reason for visit:  Foot pain  Symptom onset:  3-4 weeks ago  Symptoms include:  Foot pain  Symptom intensity:  Mild  Symptom progression:  Staying the same  Had these symptoms before:  No He is missing 1 dose(s) of medications per week.      This patient comes in to discuss his left foot pain.  He has been dealing with this for about a month.  He woke up 1 morning and had it.  When he steps down for the first time it really hurts but over the day it gets better.  However, over time it was getting worse and at times the pain was going down the lateral side of his foot.  He tells me today it seemed to be improved suddenly.  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  He is overweight and he is stepping down from his truck onto his left foot.  The foot is not red or warm and he has not taken any pain medicine.  I am also noting that his blood pressure is mildly elevated at 146/80.  He tells me he is taking his medication but because he did not go to work this morning he took his losartan only shortly before he came here.  This is a rather new medication for him I believe.  We discussed perhaps he needs an increase in the dosing but since timing of his medication is questionable I will hold off on that.  He also asks me for a refill of his tamsulosin but is asking if there is an increase in dosing available.  He did have improvement with this medication but there is room for improvement.  I told him I would send a new prescription and he can double his dosing with his current medication.      Objective    BP (!) 146/80   Pulse 79   Temp 98.6  F (37  C)   Resp 16   Ht 1.803 m (5' 11\")   Wt 111.6 kg (246 lb)   SpO2 96%   BMI 34.31 kg/m    Body mass index is 34.31 kg/m .  Physical Exam   GENERAL: healthy, " alert, no distress, and obese  RESP: lungs clear to auscultation - no rales, rhonchi or wheezes  CV: regular rates and rhythm  MS: no gross musculoskeletal defects noted, no edema        Signed Electronically by: Elo Wang MD

## 2025-05-29 DIAGNOSIS — E78.5 HYPERLIPIDEMIA, UNSPECIFIED HYPERLIPIDEMIA TYPE: ICD-10-CM

## 2025-05-29 DIAGNOSIS — I10 BENIGN ESSENTIAL HYPERTENSION: ICD-10-CM

## 2025-05-29 RX ORDER — ATORVASTATIN CALCIUM 40 MG/1
40 TABLET, FILM COATED ORAL DAILY
Qty: 90 TABLET | Refills: 2 | Status: SHIPPED | OUTPATIENT
Start: 2025-05-29

## 2025-05-29 RX ORDER — LOSARTAN POTASSIUM 25 MG/1
25 TABLET ORAL DAILY
Qty: 90 TABLET | Refills: 0 | Status: SHIPPED | OUTPATIENT
Start: 2025-05-29

## 2025-06-10 SDOH — HEALTH STABILITY: PHYSICAL HEALTH: ON AVERAGE, HOW MANY DAYS PER WEEK DO YOU ENGAGE IN MODERATE TO STRENUOUS EXERCISE (LIKE A BRISK WALK)?: 4 DAYS

## 2025-06-10 SDOH — HEALTH STABILITY: PHYSICAL HEALTH: ON AVERAGE, HOW MANY MINUTES DO YOU ENGAGE IN EXERCISE AT THIS LEVEL?: 20 MIN

## 2025-06-10 ASSESSMENT — SOCIAL DETERMINANTS OF HEALTH (SDOH): HOW OFTEN DO YOU GET TOGETHER WITH FRIENDS OR RELATIVES?: TWICE A WEEK

## 2025-06-11 ENCOUNTER — OFFICE VISIT (OUTPATIENT)
Dept: FAMILY MEDICINE | Facility: CLINIC | Age: 60
End: 2025-06-11
Payer: COMMERCIAL

## 2025-06-11 VITALS
TEMPERATURE: 98.8 F | RESPIRATION RATE: 16 BRPM | SYSTOLIC BLOOD PRESSURE: 142 MMHG | HEART RATE: 67 BPM | DIASTOLIC BLOOD PRESSURE: 80 MMHG | OXYGEN SATURATION: 97 % | BODY MASS INDEX: 34.58 KG/M2 | HEIGHT: 71 IN | WEIGHT: 247 LBS

## 2025-06-11 DIAGNOSIS — J44.9 CHRONIC OBSTRUCTIVE PULMONARY DISEASE, UNSPECIFIED COPD TYPE (H): ICD-10-CM

## 2025-06-11 DIAGNOSIS — N40.1 BENIGN PROSTATIC HYPERPLASIA WITH NOCTURIA: ICD-10-CM

## 2025-06-11 DIAGNOSIS — R35.1 BENIGN PROSTATIC HYPERPLASIA WITH NOCTURIA: ICD-10-CM

## 2025-06-11 DIAGNOSIS — I10 BENIGN ESSENTIAL HYPERTENSION: ICD-10-CM

## 2025-06-11 DIAGNOSIS — Z12.5 SCREENING FOR PROSTATE CANCER: ICD-10-CM

## 2025-06-11 DIAGNOSIS — E78.5 HYPERLIPIDEMIA, UNSPECIFIED HYPERLIPIDEMIA TYPE: ICD-10-CM

## 2025-06-11 DIAGNOSIS — Z87.891 HISTORY OF TOBACCO USE, PRESENTING HAZARDS TO HEALTH: ICD-10-CM

## 2025-06-11 DIAGNOSIS — R73.03 PREDIABETES: ICD-10-CM

## 2025-06-11 DIAGNOSIS — Z00.00 ANNUAL PHYSICAL EXAM: Primary | ICD-10-CM

## 2025-06-11 LAB
ERYTHROCYTE [DISTWIDTH] IN BLOOD BY AUTOMATED COUNT: 12.6 % (ref 10–15)
EST. AVERAGE GLUCOSE BLD GHB EST-MCNC: 123 MG/DL
HBA1C MFR BLD: 5.9 % (ref 0–5.6)
HCT VFR BLD AUTO: 45.1 % (ref 40–53)
HGB BLD-MCNC: 14.5 G/DL (ref 13.3–17.7)
MCH RBC QN AUTO: 31 PG (ref 26.5–33)
MCHC RBC AUTO-ENTMCNC: 32.2 G/DL (ref 31.5–36.5)
MCV RBC AUTO: 96 FL (ref 78–100)
PLATELET # BLD AUTO: 245 10E3/UL (ref 150–450)
RBC # BLD AUTO: 4.68 10E6/UL (ref 4.4–5.9)
WBC # BLD AUTO: 7.9 10E3/UL (ref 4–11)

## 2025-06-11 PROCEDURE — 85027 COMPLETE CBC AUTOMATED: CPT | Performed by: PHYSICIAN ASSISTANT

## 2025-06-11 PROCEDURE — G0103 PSA SCREENING: HCPCS | Performed by: PHYSICIAN ASSISTANT

## 2025-06-11 PROCEDURE — 36415 COLL VENOUS BLD VENIPUNCTURE: CPT | Performed by: PHYSICIAN ASSISTANT

## 2025-06-11 PROCEDURE — 99214 OFFICE O/P EST MOD 30 MIN: CPT | Mod: 25 | Performed by: PHYSICIAN ASSISTANT

## 2025-06-11 PROCEDURE — 80061 LIPID PANEL: CPT | Performed by: PHYSICIAN ASSISTANT

## 2025-06-11 PROCEDURE — G2211 COMPLEX E/M VISIT ADD ON: HCPCS | Performed by: PHYSICIAN ASSISTANT

## 2025-06-11 PROCEDURE — 3077F SYST BP >= 140 MM HG: CPT | Performed by: PHYSICIAN ASSISTANT

## 2025-06-11 PROCEDURE — 80053 COMPREHEN METABOLIC PANEL: CPT | Performed by: PHYSICIAN ASSISTANT

## 2025-06-11 PROCEDURE — 83036 HEMOGLOBIN GLYCOSYLATED A1C: CPT | Performed by: PHYSICIAN ASSISTANT

## 2025-06-11 PROCEDURE — 3079F DIAST BP 80-89 MM HG: CPT | Performed by: PHYSICIAN ASSISTANT

## 2025-06-11 PROCEDURE — 99396 PREV VISIT EST AGE 40-64: CPT | Performed by: PHYSICIAN ASSISTANT

## 2025-06-11 RX ORDER — LOSARTAN POTASSIUM 50 MG/1
50 TABLET ORAL DAILY
Qty: 90 TABLET | Refills: 2 | Status: SHIPPED | OUTPATIENT
Start: 2025-06-11

## 2025-06-11 RX ORDER — TAMSULOSIN HYDROCHLORIDE 0.4 MG/1
0.8 CAPSULE ORAL EVERY EVENING
Qty: 180 CAPSULE | Refills: 1 | Status: CANCELLED | OUTPATIENT
Start: 2025-06-11

## 2025-06-11 RX ORDER — ALBUTEROL SULFATE 90 UG/1
2 INHALANT RESPIRATORY (INHALATION) EVERY 6 HOURS PRN
Qty: 18 G | Refills: 3 | Status: SHIPPED | OUTPATIENT
Start: 2025-06-11

## 2025-06-11 NOTE — PROGRESS NOTES
Preventive Care Visit  [facility]  Sarbjit Muñoz PA-C, Family Medicine  Jun 11, 2025      Assessment & Plan     Annual physical exam  Overall doing well.  Healthy lifestyle discussed.  We did discuss trying to cut back on both alcohol and smoking.  He was interested in trialing a medication for weight loss.  States that he has been doing a lot more snacking.  He states he did a ketogenic diet in the past  and was able to lose weight.  Discussed trying to cut out snacking and increase activity along with watching diet to help with weight loss.  Will update labs today  - CBC with platelets; Future  - CBC with platelets    Benign essential hypertension  Blood pressure remains elevated.  On recheck was 150/84.  He is currently on losartan 25 mg daily.  Will increase losartan to 50 mg.  Recommended following up in clinic for a nurse blood pressure recheck  - Lipid panel reflex to direct LDL Fasting; Future  - losartan (COZAAR) 50 MG tablet; Take 1 tablet (50 mg) by mouth daily.  - Comprehensive metabolic panel (BMP + Alb, Alk Phos, ALT, AST, Total. Bili, TP); Future  - Comprehensive metabolic panel (BMP + Alb, Alk Phos, ALT, AST, Total. Bili, TP)    Prediabetes  Last A1c mildly elevated at 5.7.  He has gained some weight since his last visit.  Diet and exercise discussed and he is working on this.  Will update an A1c  - Hemoglobin A1c; Future  - Hemoglobin A1c  Lab Results   Component Value Date    A1C 5.7 06/05/2024    A1C 5.8 04/07/2023     Hyperlipidemia, unspecified hyperlipidemia type  Continue with Lipitor 40 mg daily.  Last lipid panel a year ago was reasonable with an LDL of 94, triglyceride 143, HDL of 46  - Lipid panel reflex to direct LDL Fasting  Recent Labs   Lab Test 06/05/24  1139 04/07/23  1018   CHOL 169 248*   HDL 46 49   LDL 94 178*   TRIG 143 103     Benign prostatic hyperplasia with nocturia  Flomax recently increased to 0.8 mg daily.  Still getting up 2-3 times a  "night.  Having some dribbling.  Recommended continuing with 0.8 mg as it has only been a few weeks.  We did discuss adding in finasteride he will update me in a few weeks if symptoms are not improving    Chronic obstructive pulmonary disease, unspecified COPD type (H)  Current smoker.  Does have intermittent coughing and wheezing especially during humid days.  Lungs are clear to auscultation.  Will start him on albuterol as needed.  If symptoms worsen may need to consider adding in a ICS maintenance medication.  - albuterol (PROAIR HFA/PROVENTIL HFA/VENTOLIN HFA) 108 (90 Base) MCG/ACT inhaler; Inhale 2 puffs into the lungs every 6 hours as needed for shortness of breath, wheezing or cough.    History of tobacco use, presenting hazards to health  Current smoker.  Underwent lung cancer screening last year which was negative for malignancy.  Showed a 4 x 6 mm stable pulmonary nodule.  Will update CT  - CT Chest Lung Cancer Screen Low Dose Without; Future    Obesity.   Needs to work on diet. Increases activity. Cut back on carbs and sugars. BMI today is 34.45 Dose not look like GLP-1 medication are covered by his insurance.     Screening for prostate cancer  PSA a year ago 3.38.  Will update PSA  - PSA, screen; Future  - PSA, screen    Colon cancer screening  Up-to-date on colonoscopy.  Next colonoscopy due 8/2026    The longitudinal plan of care for the diagnosis(es)/condition(s) as documented were addressed during this visit. Due to the added complexity in care, I will continue to support Agus in the subsequent management and with ongoing continuity of care.      Patient has been advised of split billing requirements and indicates understanding: Yes        BMI  Estimated body mass index is 34.45 kg/m  as calculated from the following:    Height as of this encounter: 1.803 m (5' 11\").    Weight as of this encounter: 112 kg (247 lb).   Weight management plan: Discussed healthy diet and exercise " guidelines    Counseling  Appropriate preventive services were addressed with this patient via screening, questionnaire, or discussion as appropriate for fall prevention, nutrition, physical activity, Tobacco-use cessation, social engagement, weight loss and cognition.  Checklist reviewing preventive services available has been given to the patient.  Reviewed patient's diet, addressing concerns and/or questions.   The patient reports drinking more than 3 alcoholic drinks per day and/or more than 7 drhnks per week. The patient was counseled and given information about possible harmful effects of excessive alcohol intake.      Subjective   Agus is a 59 year old, presenting for the following:  Physical (Fasting Physical)        6/11/2025    10:04 AM   Additional Questions   Roomed by Maria C Ramirez   Accompanied by milton          Agus is a pleasant 59-year-old male presents to the clinic today for annual physical.  Past medical history significant for hypertension, hyperlipidemia, prediabetes, BPH, current smoker, obesity and pulmonary nodule.    Recently Flomax was increased to 0.8 mg at bedtime as he was still having some urinary dribbling.  Started on Flomax a year ago and his urinary frequency and urgency have improved.  Still getting up 2-3 times at night.  Last TSH level as of 6/24 was 3.38.     Having some intermittent shortness of breath and cough especially when it is humid outside.  He is a current smoker.  Did undergo lung cancer screening a year ago which showed a 4 x 6 mm stable pulmonary nodule but negative for lung cancer screening purposes.    Started on losartan 25 mg a year ago.  Blood pressure continues to be elevated at 142/80.  On recheck was 150/88.  Denies any chest pain, shortness of breath, lightheaded or dizziness.                 Advance Care Planning            6/10/2025   General Health   How would you rate your overall physical health? (!) FAIR   Feel stress (tense, anxious, or unable to  sleep) Not at all         6/10/2025   Nutrition   Three or more servings of calcium each day? Yes   Diet: Regular (no restrictions)   How many servings of fruit and vegetables per day? (!) 2-3   How many sweetened beverages each day? 0-1         6/10/2025   Exercise   Days per week of moderate/strenous exercise 4 days   Average minutes spent exercising at this level 20 min         6/10/2025   Social Factors   Frequency of gathering with friends or relatives Twice a week   Worry food won't last until get money to buy more No   Food not last or not have enough money for food? No   Do you have housing? (Housing is defined as stable permanent housing and does not include staying outside in a car, in a tent, in an abandoned building, in an overnight shelter, or couch-surfing.) Yes   Are you worried about losing your housing? No   Lack of transportation? No   Unable to get utilities (heat,electricity)? No         6/10/2025   Fall Risk   Fallen 2 or more times in the past year? No   Trouble with walking or balance? No          6/10/2025   Dental   Dentist two times every year? Yes         Today's PHQ-2 Score:       6/10/2025     3:56 PM   PHQ-2 ( 1999 [employer])   Q1: Little interest or pleasure in doing things 0   Q2: Feeling down, depressed or hopeless 0   PHQ-2 Score 0    Q1: Little interest or pleasure in doing things Not at all   Q2: Feeling down, depressed or hopeless Not at all   PHQ-2 Score 0       Patient-reported           6/10/2025   Substance Use   If I could quit smoking, I would Neutral   I want to quit somking, worry about health affects Somewhat agree   Willing to make a plan to quit smoking Neutral   Willing to cut down before quitting Somewhat agree   Alcohol more than 3/day or more than 7/wk Yes   How often do you have a drink containing alcohol 2 to 3 times a week   How many alcohol drinks on typical day 5 or 6   How often do you have 5+ drinks at one occasion Weekly   Audit 2/3 Score 5   How often not  able to stop drinking once started Never   How often failed to do what normally expected Never   How often needed first drink in am after a heavy drinking session Never   How often feeling of guilt or remorse after drinking Never   How often unable to remember what happened the night before Never   Have you or someone else been injured because of your drinking No   Has anyone been concerned or suggested you cut down on drinking No   TOTAL SCORE - AUDIT 8   Do you use any other substances recreationally? No     Social History     Tobacco Use    Smoking status: Every Day     Current packs/day: 1.00     Average packs/day: 1 pack/day for 35.0 years (35.0 ttl pk-yrs)     Types: Cigarettes     Passive exposure: Never    Smokeless tobacco: Never   Vaping Use    Vaping status: Never Used   Substance Use Topics    Alcohol use: Yes     Alcohol/week: 12.0 standard drinks of alcohol     Types: 7 drink(s) per week    Drug use: No           6/10/2025   STI Screening   New sexual partner(s) since last STI/HIV test? No   Last PSA:   Prostate Specific Antigen Screen   Date Value Ref Range Status   06/05/2024 3.38 0.00 - 3.50 ng/mL Final   01/08/2021 2.7 0.0 - 3.5 ng/mL Final     ASCVD Risk   The 10-year ASCVD risk score (Rupesh SANTIAGO, et al., 2019) is: 14.5%    Values used to calculate the score:      Age: 59 years      Sex: Male      Is Non- : No      Diabetic: No      Tobacco smoker: Yes      Systolic Blood Pressure: 142 mmHg      Is BP treated: Yes      HDL Cholesterol: 46 mg/dL      Total Cholesterol: 169 mg/dL         Reviewed and updated as needed this visit by Provider                    Past Medical History:   Diagnosis Date    Hypertriglyceridemia     Other and unspecified hyperlipidemia     Tobacco abuse     Tobacco use disorder     Unspecified vitamin D deficiency      Past Surgical History:   Procedure Laterality Date    WISDOM TOOTH EXTRACTION       BP Readings from Last 3 Encounters:    06/11/25 (!) 142/80   05/08/25 (!) 146/80   06/28/24 136/82    Wt Readings from Last 3 Encounters:   06/11/25 112 kg (247 lb)   05/08/25 111.6 kg (246 lb)   06/28/24 106.5 kg (234 lb 14.4 oz)                  Patient Active Problem List   Diagnosis    Hypertriglyceridemia    Tobacco abuse    Other sexual dysfunction not due to a substance or known physiological condition    Vitamin D deficiency     Past Surgical History:   Procedure Laterality Date    WISDOM TOOTH EXTRACTION         Social History     Tobacco Use    Smoking status: Every Day     Current packs/day: 1.00     Average packs/day: 1 pack/day for 35.0 years (35.0 ttl pk-yrs)     Types: Cigarettes     Passive exposure: Never    Smokeless tobacco: Never   Substance Use Topics    Alcohol use: Yes     Alcohol/week: 12.0 standard drinks of alcohol     Types: 7 drink(s) per week     Family History   Problem Relation Age of Onset    Cancer - colorectal Mother         50    Unknown/Adopted Paternal Grandmother     Unknown/Adopted Paternal Grandfather     Unknown/Adopted Father     Diabetes Maternal Grandfather     Arthritis Mother     No Known Problems Half-Sister     No Known Problems Half-Sister     No Known Problems Half-Brother     No Known Problems Half-Brother          Current Outpatient Medications   Medication Sig Dispense Refill    albuterol (PROAIR HFA/PROVENTIL HFA/VENTOLIN HFA) 108 (90 Base) MCG/ACT inhaler Inhale 2 puffs into the lungs every 6 hours as needed for shortness of breath, wheezing or cough. 18 g 3    atorvastatin (LIPITOR) 40 MG tablet TAKE 1 TABLET(40 MG) BY MOUTH DAILY 90 tablet 2    losartan (COZAAR) 50 MG tablet Take 1 tablet (50 mg) by mouth daily. 90 tablet 2    Multiple Vitamin (MULTI-VITAMIN DAILY PO)       tamsulosin (FLOMAX) 0.4 MG capsule Take 2 capsules (0.8 mg) by mouth every evening. 180 capsule 1     Recent Labs   Lab Test 06/05/24  1139 04/07/23  1018 01/08/21  1039 06/21/18  1402   A1C 5.7* 5.8*  --   --    LDL 94 178*  "204* 122   HDL 46 49 51 36*   TRIG 143 103 145 217*   ALT 38 21 23 17   CR 0.98 0.94 0.88 0.91   GFRESTIMATED >90 >90 >60 >60   GFRESTBLACK  --   --  >60 >60   POTASSIUM 4.7 4.3 4.8 5.2*   TSH  --  1.76  --   --           Review of Systems  Constitutional, HEENT, cardiovascular, pulmonary, GI, , musculoskeletal, neuro, skin, endocrine and psych systems are negative, except as otherwise noted.     Objective    Exam  BP (!) 142/80 (BP Location: Left arm, Patient Position: Sitting, Cuff Size: Adult Regular)   Pulse 67   Temp 98.8  F (37.1  C) (Oral)   Resp 16   Ht 1.803 m (5' 11\")   Wt 112 kg (247 lb)   SpO2 97%   BMI 34.45 kg/m     Estimated body mass index is 34.45 kg/m  as calculated from the following:    Height as of this encounter: 1.803 m (5' 11\").    Weight as of this encounter: 112 kg (247 lb).    Physical Exam  Vitals and nursing note reviewed.   Constitutional:       General: He is not in acute distress.     Appearance: Normal appearance. He is well-developed and well-groomed. He is not ill-appearing or toxic-appearing.   HENT:      Head: Normocephalic and atraumatic.      Right Ear: Tympanic membrane, ear canal and external ear normal.      Left Ear: Tympanic membrane, ear canal and external ear normal.      Mouth/Throat:      Lips: Pink.      Mouth: Mucous membranes are moist.      Palate: No mass.      Pharynx: Oropharynx is clear. Uvula midline.      Tonsils: No tonsillar exudate or tonsillar abscesses.   Eyes:      General: Lids are normal.         Right eye: No discharge.         Left eye: No discharge.   Neck:      Trachea: Trachea normal.   Cardiovascular:      Rate and Rhythm: Normal rate and regular rhythm.      Heart sounds: S1 normal and S2 normal. No murmur heard.  Pulmonary:      Effort: Pulmonary effort is normal.      Breath sounds: Normal breath sounds and air entry.   Abdominal:      General: Bowel sounds are normal. There is no distension.      Palpations: Abdomen is soft.      " Tenderness: There is no abdominal tenderness. There is no guarding or rebound.   Musculoskeletal:      Cervical back: Full passive range of motion without pain and neck supple.      Right lower leg: No edema.      Left lower leg: No edema.   Lymphadenopathy:      Cervical: No cervical adenopathy.   Skin:     General: Skin is warm and dry.      Findings: No lesion or rash.   Neurological:      General: No focal deficit present.      Mental Status: He is alert.      Cranial Nerves: No cranial nerve deficit.      Gait: Gait is intact.      Deep Tendon Reflexes:      Reflex Scores:       Patellar reflexes are 2+ on the right side and 2+ on the left side.  Psychiatric:         Attention and Perception: Attention normal.         Mood and Affect: Mood normal.         Speech: Speech normal.         Signed Electronically by: Sarbjit Muñoz PA-C

## 2025-06-12 LAB
ALBUMIN SERPL BCG-MCNC: 4.3 G/DL (ref 3.5–5.2)
ALP SERPL-CCNC: 117 U/L (ref 40–150)
ALT SERPL W P-5'-P-CCNC: 22 U/L (ref 0–70)
ANION GAP SERPL CALCULATED.3IONS-SCNC: 9 MMOL/L (ref 7–15)
AST SERPL W P-5'-P-CCNC: 21 U/L (ref 0–45)
BILIRUB SERPL-MCNC: 0.5 MG/DL
BUN SERPL-MCNC: 17.6 MG/DL (ref 6–20)
CALCIUM SERPL-MCNC: 9.8 MG/DL (ref 8.8–10.4)
CHLORIDE SERPL-SCNC: 102 MMOL/L (ref 98–107)
CHOLEST SERPL-MCNC: 163 MG/DL
CREAT SERPL-MCNC: 0.97 MG/DL (ref 0.67–1.17)
EGFRCR SERPLBLD CKD-EPI 2021: >90 ML/MIN/1.73M2
FASTING STATUS PATIENT QL REPORTED: YES
FASTING STATUS PATIENT QL REPORTED: YES
GLUCOSE SERPL-MCNC: 105 MG/DL (ref 70–99)
HCO3 SERPL-SCNC: 26 MMOL/L (ref 22–29)
HDLC SERPL-MCNC: 38 MG/DL
LDLC SERPL CALC-MCNC: 57 MG/DL
NONHDLC SERPL-MCNC: 125 MG/DL
POTASSIUM SERPL-SCNC: 5 MMOL/L (ref 3.4–5.3)
PROT SERPL-MCNC: 7.4 G/DL (ref 6.4–8.3)
PSA SERPL DL<=0.01 NG/ML-MCNC: 3.61 NG/ML (ref 0–3.5)
SODIUM SERPL-SCNC: 137 MMOL/L (ref 135–145)
TRIGL SERPL-MCNC: 340 MG/DL

## 2025-06-26 ENCOUNTER — TRANSFERRED RECORDS (OUTPATIENT)
Dept: HEALTH INFORMATION MANAGEMENT | Facility: CLINIC | Age: 60
End: 2025-06-26
Payer: COMMERCIAL

## 2025-07-17 ENCOUNTER — HOSPITAL ENCOUNTER (OUTPATIENT)
Dept: CT IMAGING | Facility: CLINIC | Age: 60
End: 2025-07-17
Attending: PHYSICIAN ASSISTANT
Payer: COMMERCIAL

## 2025-07-17 DIAGNOSIS — Z87.891 HISTORY OF TOBACCO USE, PRESENTING HAZARDS TO HEALTH: ICD-10-CM

## 2025-07-17 PROCEDURE — 71271 CT THORAX LUNG CANCER SCR C-: CPT

## 2025-07-24 ENCOUNTER — HOSPITAL ENCOUNTER (OUTPATIENT)
Dept: MRI IMAGING | Facility: HOSPITAL | Age: 60
End: 2025-07-24
Attending: UROLOGY
Payer: COMMERCIAL

## 2025-07-24 DIAGNOSIS — R97.20 ELEVATED PSA: ICD-10-CM

## 2025-07-24 PROCEDURE — 72197 MRI PELVIS W/O & W/DYE: CPT

## 2025-07-24 PROCEDURE — 255N000002 HC RX 255 OP 636: Performed by: UROLOGY

## 2025-07-24 PROCEDURE — A9585 GADOBUTROL INJECTION: HCPCS | Performed by: UROLOGY

## 2025-07-24 RX ORDER — GADOBUTROL 604.72 MG/ML
11 INJECTION INTRAVENOUS ONCE
Status: COMPLETED | OUTPATIENT
Start: 2025-07-24 | End: 2025-07-24

## 2025-07-24 RX ADMIN — GADOBUTROL 11 ML: 604.72 INJECTION INTRAVENOUS at 16:29

## 2025-08-08 ENCOUNTER — TELEPHONE (OUTPATIENT)
Dept: FAMILY MEDICINE | Facility: CLINIC | Age: 60
End: 2025-08-08
Payer: COMMERCIAL

## 2025-08-19 ENCOUNTER — PATIENT OUTREACH (OUTPATIENT)
Dept: FAMILY MEDICINE | Facility: CLINIC | Age: 60
End: 2025-08-19
Payer: COMMERCIAL